# Patient Record
Sex: FEMALE | Race: WHITE | URBAN - METROPOLITAN AREA
[De-identification: names, ages, dates, MRNs, and addresses within clinical notes are randomized per-mention and may not be internally consistent; named-entity substitution may affect disease eponyms.]

---

## 2023-10-11 LAB
AMB EXT GLUCOSE 1HR OB: 122
AMB EXT HEMATOCRIT: 35.2
AMB EXT HEMOGLOBIN: 11.9

## 2023-10-16 ENCOUNTER — OFFICE VISIT (OUTPATIENT)
Dept: OBGYN CLINIC | Facility: CLINIC | Age: 28
End: 2023-10-16

## 2023-10-16 VITALS
HEIGHT: 67 IN | BODY MASS INDEX: 27 KG/M2 | DIASTOLIC BLOOD PRESSURE: 73 MMHG | SYSTOLIC BLOOD PRESSURE: 114 MMHG | HEART RATE: 81 BPM | WEIGHT: 172 LBS

## 2023-10-16 DIAGNOSIS — Z32.00 PREGNANCY EXAMINATION OR TEST, PREGNANCY UNCONFIRMED: Primary | ICD-10-CM

## 2023-10-16 RX ORDER — CHOLECALCIFEROL (VITAMIN D3) 25 MCG
1 TABLET,CHEWABLE ORAL DAILY
COMMUNITY

## 2023-10-20 ENCOUNTER — NURSE ONLY (OUTPATIENT)
Dept: OBGYN CLINIC | Facility: CLINIC | Age: 28
End: 2023-10-20
Payer: MEDICAID

## 2023-10-20 NOTE — PROGRESS NOTES
Pt is a  with EDC of 2024 based LMP-23. .     Med Hx-Pt denies     OB Hx-primip    Telephone OB RN Education visit. Education materials reviewed with pt including, but not limited to: plan of care, safe medications, guidance on nutrition, travel, food safety, when to call the MD,ect. Pt agreeable to blood transfusion if needed. First trimester labs done. See PN records. Pt counseled on the availability of genetic screenings including: cell free DNA, FTS w/US,Quad screen, MSAFP, and CF screening. Pt already had genetic testing done at previous OB office. Media policy for [facility] reviewed with pt. EPDS score for today-0    Epidural-yes    Circumcision-not sure    Feeding-breast    Transfusion-yes    How pt heard about the midwives office-friends    Still waiting for the rest of pt's prenatal records. Pt voices office was going to fax records to the midwives.

## 2023-10-30 ENCOUNTER — INITIAL PRENATAL (OUTPATIENT)
Dept: OBGYN CLINIC | Facility: CLINIC | Age: 28
End: 2023-10-30
Payer: MEDICAID

## 2023-10-30 VITALS
DIASTOLIC BLOOD PRESSURE: 69 MMHG | WEIGHT: 175.19 LBS | HEART RATE: 92 BPM | SYSTOLIC BLOOD PRESSURE: 110 MMHG | BODY MASS INDEX: 27 KG/M2

## 2023-10-30 DIAGNOSIS — Z34.02 ENCOUNTER FOR SUPERVISION OF NORMAL FIRST PREGNANCY IN SECOND TRIMESTER: Primary | ICD-10-CM

## 2023-10-30 PROCEDURE — 99212 OFFICE O/P EST SF 10 MIN: CPT | Performed by: ADVANCED PRACTICE MIDWIFE

## 2023-10-30 PROCEDURE — 3078F DIAST BP <80 MM HG: CPT | Performed by: ADVANCED PRACTICE MIDWIFE

## 2023-10-30 PROCEDURE — 3074F SYST BP LT 130 MM HG: CPT | Performed by: ADVANCED PRACTICE MIDWIFE

## 2023-11-10 ENCOUNTER — TELEPHONE (OUTPATIENT)
Dept: OBGYN CLINIC | Facility: CLINIC | Age: 28
End: 2023-11-10

## 2023-11-10 NOTE — TELEPHONE ENCOUNTER
Noted patient's labs were not entered during nurse education. Results entered in results console. Patient has completed NOB visit, records to scanning.
Labs/EKG/Imaging Studies

## 2023-11-29 ENCOUNTER — ROUTINE PRENATAL (OUTPATIENT)
Dept: OBGYN CLINIC | Facility: CLINIC | Age: 28
End: 2023-11-29
Payer: MEDICAID

## 2023-11-29 VITALS
SYSTOLIC BLOOD PRESSURE: 113 MMHG | DIASTOLIC BLOOD PRESSURE: 79 MMHG | BODY MASS INDEX: 28 KG/M2 | HEART RATE: 98 BPM | WEIGHT: 179 LBS

## 2023-11-29 DIAGNOSIS — Z34.03 PRENATAL CARE, FIRST PREGNANCY IN THIRD TRIMESTER: Primary | ICD-10-CM

## 2023-11-29 PROBLEM — Z34.90 PREGNANCY (HCC): Status: ACTIVE | Noted: 2023-11-29

## 2023-11-29 PROBLEM — Z34.90 PREGNANCY: Status: ACTIVE | Noted: 2023-11-29

## 2023-11-29 PROBLEM — Z59.41 FOOD INSECURITY: Status: ACTIVE | Noted: 2023-11-29

## 2023-11-29 PROCEDURE — 99212 OFFICE O/P EST SF 10 MIN: CPT | Performed by: ADVANCED PRACTICE MIDWIFE

## 2023-11-29 PROCEDURE — 3078F DIAST BP <80 MM HG: CPT | Performed by: ADVANCED PRACTICE MIDWIFE

## 2023-11-29 PROCEDURE — 3074F SYST BP LT 130 MM HG: CPT | Performed by: ADVANCED PRACTICE MIDWIFE

## 2023-11-29 NOTE — PROGRESS NOTES
Aureliano Arroyo, is at 31w0d, here for her [facility] visit. Currently, she is feeling well. Denies 3rd trimester danger signs. Obtained her records/labs/ultrasounds electronically and has them on her phone. Will upload screenshots after visit. She states her LMP is not certain and she was dated by early ultrasound. She believes she is between 31w4 and 32wga. Considering Tdap. Will decide before next visit. Of note: When history was reconciled, food insecurity was noted. When asked, the patient denied any such concern. Removed from problem list.    Vital signs and weight reviewed  See flowsheets     Assessment/Plan: Tdap recommended today but deferred at this time. Considering  Missing labs and ultrasounds: Pt to upload  Next visit: 2 weeks    Reviewed:   Prenatal visit schedule  Recommendations and rationale for Tdap vaccine(s) in pregnancy   labor precautions  Kick counts  Danger signs    Pt verbalized understanding. All questions answered.  No barriers to learning identified

## 2023-12-14 ENCOUNTER — ROUTINE PRENATAL (OUTPATIENT)
Dept: OBGYN CLINIC | Facility: CLINIC | Age: 28
End: 2023-12-14

## 2023-12-14 ENCOUNTER — TELEPHONE (OUTPATIENT)
Dept: OBGYN CLINIC | Facility: CLINIC | Age: 28
End: 2023-12-14

## 2023-12-14 VITALS
SYSTOLIC BLOOD PRESSURE: 128 MMHG | DIASTOLIC BLOOD PRESSURE: 83 MMHG | HEART RATE: 98 BPM | WEIGHT: 184 LBS | BODY MASS INDEX: 29 KG/M2

## 2023-12-14 DIAGNOSIS — Z28.21 TETANUS, DIPHTHERIA, AND ACELLULAR PERTUSSIS (TDAP) VACCINATION DECLINED: ICD-10-CM

## 2023-12-14 DIAGNOSIS — Z34.03 ENCOUNTER FOR SUPERVISION OF NORMAL FIRST PREGNANCY IN THIRD TRIMESTER: Primary | ICD-10-CM

## 2023-12-14 PROCEDURE — 3079F DIAST BP 80-89 MM HG: CPT | Performed by: ADVANCED PRACTICE MIDWIFE

## 2023-12-14 PROCEDURE — 99212 OFFICE O/P EST SF 10 MIN: CPT | Performed by: ADVANCED PRACTICE MIDWIFE

## 2023-12-14 PROCEDURE — 3074F SYST BP LT 130 MM HG: CPT | Performed by: ADVANCED PRACTICE MIDWIFE

## 2023-12-14 NOTE — PROGRESS NOTES
Gideon Trujillo is a 25year old , at 33w4d, here for her return OB visit. Currently, she is feeling well. Denies contractions, bleeding, and leakage of fluid. Endorses active fetus. Does not want to get Tdap vaccine in pregnancy. She brought her prenatal records from her previous practice with her today. Vital signs and weight reviewed  See flowsheets     Today's Assessment/Plan:   Reviewed recommendation for RSV vaccine between 32-36 weeks of pregnancy and handout provided to pt. Pt will think about it for next visit. Next visit: Follow up OB: 2 weeks    Reviewed:   3rd trimester precautions and expectations   labor precautions  Kick counts  Danger signs  Prenatal visit schedule      Pt verbalized understanding. All questions answered.  No barriers to learning identified

## 2023-12-15 ENCOUNTER — TELEPHONE (OUTPATIENT)
Dept: OBGYN CLINIC | Facility: CLINIC | Age: 28
End: 2023-12-15

## 2023-12-26 ENCOUNTER — ROUTINE PRENATAL (OUTPATIENT)
Dept: OBGYN CLINIC | Facility: CLINIC | Age: 28
End: 2023-12-26

## 2023-12-26 ENCOUNTER — TELEPHONE (OUTPATIENT)
Dept: OBGYN CLINIC | Facility: CLINIC | Age: 28
End: 2023-12-26

## 2023-12-26 VITALS
WEIGHT: 189 LBS | DIASTOLIC BLOOD PRESSURE: 82 MMHG | BODY MASS INDEX: 30 KG/M2 | HEART RATE: 83 BPM | SYSTOLIC BLOOD PRESSURE: 120 MMHG

## 2023-12-26 DIAGNOSIS — Z34.03 ENCOUNTER FOR SUPERVISION OF NORMAL FIRST PREGNANCY IN THIRD TRIMESTER: Primary | ICD-10-CM

## 2023-12-26 PROCEDURE — 3074F SYST BP LT 130 MM HG: CPT | Performed by: ADVANCED PRACTICE MIDWIFE

## 2023-12-26 PROCEDURE — 3079F DIAST BP 80-89 MM HG: CPT | Performed by: ADVANCED PRACTICE MIDWIFE

## 2023-12-26 PROCEDURE — 99212 OFFICE O/P EST SF 10 MIN: CPT | Performed by: ADVANCED PRACTICE MIDWIFE

## 2023-12-26 NOTE — PROGRESS NOTES
Active fetus Denies any complaints. Denies any vaginal bleeding, leaking of fluid or vaginal discharge. No signs signs of PTL. Reviewed S&S of PTL  Warning signs reviewed  All questions answered.   Counseled on RSV vaccine  pt refuses  Counseled on GBS - next visit

## 2024-01-01 NOTE — PROGRESS NOTES
Patient's last menstrual period was 2023 (approximate). 2024, by Ultrasound 36w1d here today for ABDIRASHID visit. Baby active. Denies contractions, LOF or bleeding.     GBS collected today.   Declines RSV vaccine  Reveiwed records available/scanned and not all NOB labs available. Ordered and pt to go down to lab to draw.     SOL and warning signs reviewed. 36 wk packet given. To review birth plan at [facility]     Declines Circ    Plans epidural. Ok with routine meds. 36 wk packet given and list of Peds given. Likely will use [facility].

## 2024-01-02 ENCOUNTER — LAB ENCOUNTER (OUTPATIENT)
Dept: LAB | Facility: HOSPITAL | Age: 29
End: 2024-01-02
Attending: ADVANCED PRACTICE MIDWIFE
Payer: MEDICAID

## 2024-01-02 ENCOUNTER — ROUTINE PRENATAL (OUTPATIENT)
Dept: OBGYN CLINIC | Facility: CLINIC | Age: 29
End: 2024-01-02

## 2024-01-02 VITALS
BODY MASS INDEX: 29 KG/M2 | SYSTOLIC BLOOD PRESSURE: 132 MMHG | HEART RATE: 86 BPM | DIASTOLIC BLOOD PRESSURE: 79 MMHG | WEIGHT: 188 LBS

## 2024-01-02 DIAGNOSIS — Z34.03 ENCOUNTER FOR SUPERVISION OF NORMAL FIRST PREGNANCY IN THIRD TRIMESTER: ICD-10-CM

## 2024-01-02 DIAGNOSIS — Z34.03 ENCOUNTER FOR SUPERVISION OF NORMAL FIRST PREGNANCY IN THIRD TRIMESTER: Primary | ICD-10-CM

## 2024-01-02 PROBLEM — Z71.85 VACCINE COUNSELING: Status: ACTIVE | Noted: 2024-01-02

## 2024-01-02 LAB
HBV SURFACE AG SER-ACNC: <0.1 [IU]/L
HBV SURFACE AG SERPL QL IA: NONREACTIVE
HCV AB SERPL QL IA: NONREACTIVE
RUBV IGG SER-ACNC: 8.8 IU/ML (ref 10–?)
T PALLIDUM AB SER QL IA: NONREACTIVE

## 2024-01-02 PROCEDURE — 87389 HIV-1 AG W/HIV-1&-2 AB AG IA: CPT

## 2024-01-02 PROCEDURE — 83021 HEMOGLOBIN CHROMOTOGRAPHY: CPT

## 2024-01-02 PROCEDURE — 3078F DIAST BP <80 MM HG: CPT | Performed by: ADVANCED PRACTICE MIDWIFE

## 2024-01-02 PROCEDURE — 3075F SYST BP GE 130 - 139MM HG: CPT | Performed by: ADVANCED PRACTICE MIDWIFE

## 2024-01-02 PROCEDURE — 83020 HEMOGLOBIN ELECTROPHORESIS: CPT

## 2024-01-02 PROCEDURE — 86787 VARICELLA-ZOSTER ANTIBODY: CPT

## 2024-01-02 PROCEDURE — 99213 OFFICE O/P EST LOW 20 MIN: CPT | Performed by: ADVANCED PRACTICE MIDWIFE

## 2024-01-02 PROCEDURE — 86780 TREPONEMA PALLIDUM: CPT

## 2024-01-02 PROCEDURE — 36415 COLL VENOUS BLD VENIPUNCTURE: CPT

## 2024-01-02 PROCEDURE — 86803 HEPATITIS C AB TEST: CPT

## 2024-01-02 PROCEDURE — 87340 HEPATITIS B SURFACE AG IA: CPT

## 2024-01-02 PROCEDURE — 86762 RUBELLA ANTIBODY: CPT

## 2024-01-02 NOTE — PATIENT INSTRUCTIONS
Kick Counts  It’s normal to worry about your baby’s health. One way you can know your baby’s doing well is to record the baby’s movements once a day. This is called a kick count.   Remember to take your kick count records to all your appointments with your healthcare provider.  How to count kicks    Time how long it takes you to feel 10 kicks, flutters, swishes, or rolls. Ideally, you want to feel at least 10 movements in 2 hours. You will likely feel 10 movements in less time than that.  Starting at 28 weeks, count your baby's movements daily. Follow your healthcare provider's instructions for kick counting. Here are tips for counting kicks:  Choose a time when the baby is active, such as after a meal.   Sit comfortably or lie on your side.   The first time the baby moves, write down the time.   Count each movement until the baby has moved  10 times. This can take from 20 minutes to 2 hours.   If you haven't felt 10 kicks by the end of the second hour, wait a few hours. Then try again.  Try to do it at the same time each day.  When to call your healthcare provider  Call your healthcare provider  right away if:  You do a couple sets of kick counts during the day and your baby moves fewer than 10 times in 2 hours.  Your baby moves much less often than on the days before.  You haven't felt your baby move all day.  [employer] last reviewed this educational content on 8/1/2020    © 4290-5040 [employer]. All rights reserved. This information is not intended as a substitute for professional medical care. Always follow your healthcare professional's instructions. Understanding Preeclampsia  Preeclampsia is a condition that can happen in pregnancy. It includes high blood pressure (hypertension), swelling, and signs of organ problems. It can show up around week 20 of pregnancy. It often goes away by 12 weeks after you give birth. It can lead to serious health risks for you and your baby. During your  pregnancy, your healthcare provider will watch your blood pressure.      Your blood pressure will be monitored regularly throughout your pregnancy to help check for preeclampsia.     Dangers of preeclampsia   If not treated, preeclampsia can cause problems for you and your baby. The placenta is the organ that nourishes your baby. It may tear away from the wall of the uterus. This can put the baby at risk for health problems (fetal distress). It can put the baby at risk for  birth. Preeclampsia can also cause these health problems in you:   Kidney failure or other organ damage  Seizures  Stroke  Who’s at risk for preeclampsia?   No one knows what causes preeclampsia. It can happen in any pregnant person. But there are things that increase your risk. You may need to take a daily low dose of aspirin if you are at risk for preeclampsia.   You’re at higher risk for preeclampsia if you have any of these:  Diabetes  High blood pressure  Obesity  Kidney disease  Autoimmune disease such as lupus  A family history of preeclampsia  You’re at higher risk if any of these apply to you:  This is your first pregnancy  You are having twins or more  You’re under age 18 or over age 40  You used in vitro fertilization  You are Black  And you’re at higher risk if you had any of these in a past pregnancy:   Preeclampsia  Intrauterine growth retardation (IUGR)   birth  Placental abruption  Fetal death  Symptoms  A common symptom of preeclampsia is high blood pressure. Other symptoms may include:   Fast weight gain  Protein in your urine  Headache  Belly (abdominal) pain on your right side  Vision problems such as flashes or spots  Swelling (edema) in your face or hands (this often happens near the end of a normal pregnancy)  Tests you may have   Your healthcare provider will want to check your blood pressure. This will need to be done often in your pregnancy. If your blood pressure is high, you may have these tests:   Urine  tests to look for protein  Blood tests to confirm preeclampsia  Fetal monitoring to make sure that your baby is healthy  Treating preeclampsia   Preeclampsia almost always ends soon after you give birth. Until then, your healthcare provider can help you manage it.   If your symptoms are mild, you may to:     Limit your activity  Rest in bed  Not do heavy lifting    If your symptoms are severe, you will stay in the hospital. Treatment here may include:   Limits to your activity. This is to help control your blood pressure. You should not lift anything heavy. You will need to spend 8 hours a day lying down with your feet up.  Magnesium IV (intravenous) drip. This is done during labor. It's to prevent seizures  Induced labor or  section. Birth of the baby is considered the cure for preeclampsia.  When to call your healthcare provider   Call your healthcare provider if your symptoms start quickly or are severe. This includes swelling, weight gain, or other symptoms. Some cases of preeclampsia are more severe than others. Your symptoms also may change or get worse as you get closer to your due date.   Once you give birth   In most cases, preeclampsia goes away on its own soon after you give birth. This is often by the 12th week after you deliver. Within days after you give birth, your blood pressure, swelling, and other symptoms should get better. But for some people, problems from preeclampsia can continue after birth.   Postpartum preeclampsia   Preeclampsia that starts after birth is rare. There are 2 types:   Postpartum preeclampsia. This may start in the first 48 hours after birth.  Late-onset preeclampsia. This starts more than 48 hours after birth.  Both of these types are rare. But call your healthcare provider right away if you have symptoms of preeclampsia after you give birth.   [employer] last reviewed this educational content on 10/1/2021    © 2966-4763 [employer]. All rights reserved.  This information is not intended as a substitute for professional medical care. Always follow your healthcare professional's instructions.

## 2024-01-03 ENCOUNTER — TELEPHONE (OUTPATIENT)
Dept: OBGYN CLINIC | Facility: CLINIC | Age: 29
End: 2024-01-03

## 2024-01-03 PROBLEM — Z28.39 RUBELLA NON-IMMUNE STATUS, ANTEPARTUM (HCC): Status: ACTIVE | Noted: 2024-01-03

## 2024-01-03 PROBLEM — Z28.39 RUBELLA NON-IMMUNE STATUS, ANTEPARTUM: Status: ACTIVE | Noted: 2024-01-03

## 2024-01-03 PROBLEM — O09.899 RUBELLA NON-IMMUNE STATUS, ANTEPARTUM (HCC): Status: ACTIVE | Noted: 2024-01-03

## 2024-01-03 PROBLEM — O09.899 RUBELLA NON-IMMUNE STATUS, ANTEPARTUM: Status: ACTIVE | Noted: 2024-01-03

## 2024-01-03 LAB
HGB A2 MFR BLD: 2.5 % (ref 1.5–3.5)
HGB PNL BLD ELPH: 97.5 % (ref 95.5–100)

## 2024-01-03 NOTE — TELEPHONE ENCOUNTER
Name and  verified    Patient given MJ results and recommendations and verbalized understanding. Rubella non-immune precautions discussed.

## 2024-01-03 NOTE — TELEPHONE ENCOUNTER
----- Message from Gloria Butler CNM sent at 1/3/2024 12:46 PM CST -----  Rubella non immune, will need MMR PP. Otherwise normal labs. Please inform. Thanks MJ

## 2024-01-04 LAB — GROUP B STREP BY PCR FOR PCR OVT: POSITIVE

## 2024-01-05 PROBLEM — O99.820 GROUP B STREPTOCOCCAL CARRIAGE COMPLICATING PREGNANCY (HCC): Status: ACTIVE | Noted: 2024-01-05

## 2024-01-05 PROBLEM — O99.820 GROUP B STREPTOCOCCAL CARRIAGE COMPLICATING PREGNANCY: Status: ACTIVE | Noted: 2024-01-05

## 2024-01-05 LAB — VZV IGG SER IA-ACNC: 156.2 (ref 165–?)

## 2024-01-07 PROBLEM — O09.899 SUSCEPTIBLE TO VARICELLA (NON-IMMUNE), CURRENTLY PREGNANT (HCC): Status: ACTIVE | Noted: 2024-01-07

## 2024-01-07 PROBLEM — O09.899 SUSCEPTIBLE TO VARICELLA (NON-IMMUNE), CURRENTLY PREGNANT: Status: ACTIVE | Noted: 2024-01-07

## 2024-01-07 PROBLEM — Z28.39 SUSCEPTIBLE TO VARICELLA (NON-IMMUNE), CURRENTLY PREGNANT (HCC): Status: ACTIVE | Noted: 2024-01-07

## 2024-01-07 PROBLEM — Z28.39 SUSCEPTIBLE TO VARICELLA (NON-IMMUNE), CURRENTLY PREGNANT: Status: ACTIVE | Noted: 2024-01-07

## 2024-01-08 NOTE — PROGRESS NOTES
Ernestine Huff is a 28 year old  pt at 37w2d here for ABDIRASHID  She is feeling well, Had a HA a couple days ago lasting a few hours, and epigastric pain.  denies dizziness, BV, spots or  Transfer from [facility]    Birth plan reviewed:    Support: , Arun  Pain management: OK with epidural  Vitamin K: yes  Erythromycin ointment: yes  Placenta: cord blood banking,   Circumcision: boy, declines  Peds: in House, undecided on practice  Housing/car seat: yes/ yes  Contraception: undecided  Feeding Method:  breast  Pump:  has      ROS:  Some back pain/BH, just a few, Denies cramping, bleeding, leaking of fluid.  Fetus is active.      Vitals:    24 1613   BP: 134/84   Pulse: 108   Weight: 190 lb (86.2 kg)      See flowsheet  TW lbs  Tdap, RSV declined  GBS pos  Elevated BP reading  GBS positive    Reviewed:  Labor precautions  Kick counts  Danger Signs/PreE s/s  Needs repeat HIV and Trep on admission  RTC 3 days for repeat BP check if sent home from triage  To triage now for r/o PreEMagali RN notified    Pt verbalized understanding.  All questions answered.  No barriers to learning identified

## 2024-01-09 ENCOUNTER — ROUTINE PRENATAL (OUTPATIENT)
Dept: OBGYN CLINIC | Facility: CLINIC | Age: 29
End: 2024-01-09

## 2024-01-09 ENCOUNTER — HOSPITAL ENCOUNTER (OUTPATIENT)
Facility: HOSPITAL | Age: 29
Discharge: HOME | End: 2024-01-09
Attending: ADVANCED PRACTICE MIDWIFE | Admitting: OBSTETRICS & GYNECOLOGY
Payer: MEDICAID

## 2024-01-09 VITALS
SYSTOLIC BLOOD PRESSURE: 134 MMHG | HEART RATE: 108 BPM | DIASTOLIC BLOOD PRESSURE: 84 MMHG | WEIGHT: 190 LBS | BODY MASS INDEX: 30 KG/M2

## 2024-01-09 VITALS — SYSTOLIC BLOOD PRESSURE: 131 MMHG | HEART RATE: 91 BPM | DIASTOLIC BLOOD PRESSURE: 85 MMHG

## 2024-01-09 DIAGNOSIS — Z34.03 ENCOUNTER FOR SUPERVISION OF NORMAL FIRST PREGNANCY IN THIRD TRIMESTER: Primary | ICD-10-CM

## 2024-01-09 LAB
ALBUMIN SERPL-MCNC: 3.7 G/DL (ref 3.2–4.8)
ALBUMIN/GLOB SERPL: 1.1 {RATIO} (ref 1–2)
ALP LIVER SERPL-CCNC: 148 U/L
ALT SERPL-CCNC: 23 U/L
ANION GAP SERPL CALC-SCNC: 6 MMOL/L (ref 0–18)
AST SERPL-CCNC: 23 U/L (ref ?–34)
BASOPHILS # BLD AUTO: 0.02 X10(3) UL (ref 0–0.2)
BASOPHILS NFR BLD AUTO: 0.2 %
BILIRUB SERPL-MCNC: 0.3 MG/DL (ref 0.3–1.2)
BUN BLD-MCNC: 7 MG/DL (ref 9–23)
BUN/CREAT SERPL: 10.8 (ref 10–20)
CALCIUM BLD-MCNC: 9.4 MG/DL (ref 8.7–10.4)
CHLORIDE SERPL-SCNC: 103 MMOL/L (ref 98–112)
CO2 SERPL-SCNC: 24 MMOL/L (ref 21–32)
CREAT BLD-MCNC: 0.65 MG/DL
CREAT UR-SCNC: 70.4 MG/DL
DEPRECATED RDW RBC AUTO: 45.6 FL (ref 35.1–46.3)
EGFRCR SERPLBLD CKD-EPI 2021: 121 ML/MIN/1.73M2 (ref 60–?)
EOSINOPHIL # BLD AUTO: 0.07 X10(3) UL (ref 0–0.7)
EOSINOPHIL NFR BLD AUTO: 0.7 %
ERYTHROCYTE [DISTWIDTH] IN BLOOD BY AUTOMATED COUNT: 13.5 % (ref 11–15)
FASTING STATUS PATIENT QL REPORTED: NO
GLOBULIN PLAS-MCNC: 3.5 G/DL (ref 2.8–4.4)
GLUCOSE BLD-MCNC: 88 MG/DL (ref 70–99)
HCT VFR BLD AUTO: 36.3 %
HGB BLD-MCNC: 12.1 G/DL
IMM GRANULOCYTES # BLD AUTO: 0.05 X10(3) UL (ref 0–1)
IMM GRANULOCYTES NFR BLD: 0.5 %
LYMPHOCYTES # BLD AUTO: 2.3 X10(3) UL (ref 1–4)
LYMPHOCYTES NFR BLD AUTO: 21.9 %
MCH RBC QN AUTO: 30.8 PG (ref 26–34)
MCHC RBC AUTO-ENTMCNC: 33.3 G/DL (ref 31–37)
MCV RBC AUTO: 92.4 FL
MONOCYTES # BLD AUTO: 0.59 X10(3) UL (ref 0.1–1)
MONOCYTES NFR BLD AUTO: 5.6 %
NEUTROPHILS # BLD AUTO: 7.49 X10 (3) UL (ref 1.5–7.7)
NEUTROPHILS # BLD AUTO: 7.49 X10(3) UL (ref 1.5–7.7)
NEUTROPHILS NFR BLD AUTO: 71.1 %
OSMOLALITY SERPL CALC.SUM OF ELEC: 273 MOSM/KG (ref 275–295)
PLATELET # BLD AUTO: 280 10(3)UL (ref 150–450)
POTASSIUM SERPL-SCNC: 3.9 MMOL/L (ref 3.5–5.1)
PROT SERPL-MCNC: 7.2 G/DL (ref 5.7–8.2)
PROT UR-MCNC: 15.7 MG/DL (ref ?–14)
PROT/CREAT UR-RTO: 0.22
RBC # BLD AUTO: 3.93 X10(6)UL
SODIUM SERPL-SCNC: 133 MMOL/L (ref 136–145)
WBC # BLD AUTO: 10.5 X10(3) UL (ref 4–11)

## 2024-01-09 PROCEDURE — 3079F DIAST BP 80-89 MM HG: CPT | Performed by: ADVANCED PRACTICE MIDWIFE

## 2024-01-09 PROCEDURE — 59025 FETAL NON-STRESS TEST: CPT | Performed by: ADVANCED PRACTICE MIDWIFE

## 2024-01-09 PROCEDURE — 3075F SYST BP GE 130 - 139MM HG: CPT | Performed by: ADVANCED PRACTICE MIDWIFE

## 2024-01-09 PROCEDURE — 99212 OFFICE O/P EST SF 10 MIN: CPT | Performed by: ADVANCED PRACTICE MIDWIFE

## 2024-01-09 NOTE — PROGRESS NOTES
Pt is a 28 year old female admitted to TR3/TR3-A.     Chief Complaint   Patient presents with    R/o Preeclampsia     Sent from office for high BP, HA and epigatric pain      Pt is  37w2d intra-uterine pregnancy.  History obtained, consents signed. Oriented to room, staff, and plan of care.

## 2024-01-10 PROBLEM — R51.9 HEADACHE IN PREGNANCY, ANTEPARTUM, THIRD TRIMESTER: Status: ACTIVE | Noted: 2024-01-10

## 2024-01-10 PROBLEM — R51.9 HEADACHE IN PREGNANCY, ANTEPARTUM, THIRD TRIMESTER (HCC): Status: ACTIVE | Noted: 2024-01-10

## 2024-01-10 PROBLEM — O26.893 HEADACHE IN PREGNANCY, ANTEPARTUM, THIRD TRIMESTER (HCC): Status: ACTIVE | Noted: 2024-01-10

## 2024-01-10 PROBLEM — O26.893 HEADACHE IN PREGNANCY, ANTEPARTUM, THIRD TRIMESTER: Status: ACTIVE | Noted: 2024-01-10

## 2024-01-10 NOTE — TRIAGE
[facility]      Triage Note    Ernestine Huff Patient Status:  Outpatient    1995 MRN [id number]   Location [facility] Attending Ingrid Argueta CNM   Hosp Day # 0 PCP No primary care provider on file.      Para:   Estimated Date of Delivery: 24  Gestation: 37w2d    Chief Complaint    R/o Preeclampsia         Allergies:  No Known Allergies    Orders Placed This Encounter   Procedures    Comp Metabolic Panel (14)    CBC With Differential With Platelet    Protein/Creatinine Ratio, Urine Random       Lab Results   Component Value Date    WBC 10.5 2024    HGB 12.1 2024    HCT 36.3 2024    .0 2024    CREATSERUM 0.65 2024    BUN 7 (L) 2024     (L) 2024    K 3.9 2024     2024    CO2 24.0 2024    GLU 88 2024    CA 9.4 2024    ALB 3.7 2024    ALKPHO 148 (H) 2024    BILT 0.3 2024    TP 7.2 2024    AST 23 2024    ALT 23 2024       Clinitek UA  Lab Results   Component Value Date    URINECUL  2024     <10,000 cfu/ml Mixture of Gram positive organisms isolated - probable contamination.       UA  No results found for: \"COLORUR\", \"CLARITY\", \"SPECGRAVITY\", \"PROUR\", \"GLUUR\", \"KETUR\", \"BILUR\", \"BLOODURINE\", \"NITRITE\", \"UROBILINOGEN\", \"LEUUR\", \"UASA\"    Vitals:    24 1700 24 1715 24 1730   BP: 128/88 131/82 129/88   Pulse: 98 92 92       NST  Variability: Moderate           Accelerations: Yes           Decelerations: None            Baseline: 145 BPM           Uterine Irritability: Yes           Contractions: Not present                                        Acoustic Stimulator: No           Nonstress Test Interpretation: Reactive           Nonstress Test Second Interpretation: Reactive          FHR Category: Category I             Additional Comments Comments:  35  sent from office to triage for elevated BPs.  Bps in triage WNL. Labs WNL. RONALDO Argueta Notified.     Chief Complaint   Patient presents with    R/o Preeclampsia     Sent from office for high BP, HA and epigatric pain         Magali CELESTIN RN  1/9/2024 6:10 PM      CNM Addendum  NST, labs and blood pressures reviewed and WNL. Discharge home. Follow-up with ABDIRASHID next week.  Ingrid Argueta CNM

## 2024-01-18 ENCOUNTER — ROUTINE PRENATAL (OUTPATIENT)
Dept: OBGYN CLINIC | Facility: CLINIC | Age: 29
End: 2024-01-18

## 2024-01-18 VITALS
SYSTOLIC BLOOD PRESSURE: 129 MMHG | DIASTOLIC BLOOD PRESSURE: 84 MMHG | HEART RATE: 92 BPM | BODY MASS INDEX: 30 KG/M2 | WEIGHT: 193 LBS

## 2024-01-18 DIAGNOSIS — Z34.03 ENCOUNTER FOR SUPERVISION OF NORMAL FIRST PREGNANCY IN THIRD TRIMESTER: Primary | ICD-10-CM

## 2024-01-18 PROCEDURE — 99212 OFFICE O/P EST SF 10 MIN: CPT | Performed by: ADVANCED PRACTICE MIDWIFE

## 2024-01-18 PROCEDURE — 3079F DIAST BP 80-89 MM HG: CPT | Performed by: ADVANCED PRACTICE MIDWIFE

## 2024-01-18 PROCEDURE — 3074F SYST BP LT 130 MM HG: CPT | Performed by: ADVANCED PRACTICE MIDWIFE

## 2024-01-18 NOTE — PROGRESS NOTES
Ernestine is a 28 year old , at 38w4d, here for her return OB visit.  Currently, she is feeling well. Denies contractions, bleeding, leakage of fluid, and headache. Endorses active fetus.    Vital signs and weight reviewed  See flowsheets       Today's Assessment/Plan:   Recognizing labor reviewed  Natural methods for cervical ripening/stimulating labor reviewed    Next visit: Follow up OB: 1 week    Reviewed:   3rd trimester precautions and expectations  Labor precautions  Kick counts  Danger signs      Pt verbalized understanding. All questions answered. No barriers to learning identified

## 2024-01-18 NOTE — PATIENT INSTRUCTIONS
Understanding Preeclampsia  Preeclampsia is high blood pressure (hypertension) that happens during pregnancy. It often shows up around the 20th week of pregnancy. It often goes back to normal by the 12th week after you give birth. It can lead to serious health risks for you and your baby. During your pregnancy, your healthcare provider will watch your blood pressure.    Symptoms  A common symptom of preeclampsia is high blood pressure. Other symptoms may include:  Rapid weight gain  Protein in your urine  Headache  Belly (abdominal) pain on your right side  Vision problems. These include flashes or spots.  Swelling (edema) in your face or hands. This also often happens near the end of normal pregnancies, even without preeclampsia.  Tests you may have  Your healthcare provider will want to check your blood pressure throughout your pregnancy. If your blood pressure is high, you may have the following tests:  Urine tests to look for protein  Blood tests to confirm preeclampsia  Fetal monitoring to make sure that your baby is healthy  Treating preeclampsia  You may need to take a daily low dose of aspirin if you are at risk for preeclampsia. Preeclampsia almost always ends soon after you give birth. Until then, your healthcare provider can help manage your condition. If your symptoms are mild, you may need activity limits at home, including bed rest and no heavy lifting. If your symptoms are severe, you will stay in the hospital. Hospital treatment includes:  Activity limits to help control blood pressure. This means no heavy lifting and 8 hours per day lying down with the feet up.  Magnesium IV (intravenous) drip during labor to prevent seizures  Induced labor or surgical delivery by  section. Delivery is considered the cure for preeclampsia.  When to call your healthcare provider  Call your healthcare provider if swelling, weight gain, or other symptoms come on quickly or are severe. Some cases of  preeclampsia are more severe than others. Your symptoms also may change or get worse as you get closer to your due date.  Who’s at risk?  No one knows what causes preeclampsia. Preeclampsia can happen in any pregnant woman. But it is more common in first-time pregnancies. Things that increase the risk include:  Previous pregnancies. You are at risk if you had preeclampsia, intrauterine growth retardation (IUGR),  birth, placental abruption, or fetal death in a past pregnancy.  Health history of mother. You are at risk if you have diabetes, high blood pressure, obesity, kidney disease, autoimmune disease such as lupus, or a family history of preeclampsia.  Current pregnancy. You are at risk if this is your first pregnancy, or if you have multiple fetuses, are younger than age 18 or older than 40, or used in vitro fertilization.  Race. You are at risk if you are black.  Dangers of preeclampsia  If not treated, preeclampsia can cause problems for you and your baby. The placenta is the organ that nourishes your baby. It may tear away from the uterine wall. This can put the baby at risk for health problems (fetal distress) and premature birth. Preeclampsia can also cause these health problems:  Kidney failure or other organ damage  Seizures  Stroke  Once you give birth  In most cases, preeclampsia goes away on its own soon after you give birth. This is often by the 12th week after you give deliver. Within days of delivery, your blood pressure, swelling, and other symptoms should get better. For some women, problems from preeclampsia can continue after delivery.  Postpartum preeclampsia that develops within the first 48 hours after delivery is rare. Another type of postpartum preeclampsia that develops more than 48 hours after delivery is called late-onset preeclampsia. It is also rare. Contact your healthcare provider right away if you have symptoms of preeclampsia after you deliver.  Ellen last reviewed this  educational content on 12/1/2019 © 2000-2020 [employer]. [address] [place] [id number]. All rights reserved. This information is not intended as a substitute for professional medical care. Always follow your healthcare professional's instructions.        Kick Counts    It’s normal to worry about your baby’s health. One way you can know your baby’s doing well is to record the baby’s movements once a day. This is called a kick count. Remember to take your kick count records to all your appointments with your healthcare provider.  How to count kicks  Time how long it takes you to feel 10 kicks, flutters, swishes, or rolls. Ideally, you want to feel at least 10 movements within 2 hours. You will likely feel 10 movements in less time than that.  Starting at 28 weeks, count your baby's movements daily. Follow your healthcare provider's instructions for kick counting. Here are tips for counting kicks:  Choose a time when the baby is active, such as after a meal.   Sit comfortably or lie on your side.   The first time the baby moves, write down the time.   Count each movement until the baby has moved 10 times. This can take from 20 minutes to 2 hours.   If you have not felt 10 kicks by the end of the second hour, wait a few hours. Then try again.  Try to do it at the same time each day.  When to call your healthcare provider  Call your healthcare provider right away if:  You do a couple sets of kick counts during the day and your baby moves fewer than 10 times in 2 hours  Your baby moves much less often than on the days before.  You have not felt your baby move all day.  [employer] last reviewed this educational content on 12/1/2017 © 2000-2020 [employer]. [address] [place] [id number]. All rights reserved. This information is not intended as a substitute for professional medical care. Always follow your healthcare professional's instructions.        Recognizing  Labor    The beginning of labor is the beginning of birth. You’ll start to feel strong contractions. That’s when the muscles of your uterus tighten up to help push your baby out during birth.  Yes, labor has probably started   Signs of labor include:  Your contractions are getting stronger and more painful instead of weaker. You’ll probably feel them throughout your whole uterus.  Your contractions are regular. This means that you feel them about every 5 to 10 minutes. And they are getting closer together.  You have pink-colored or blood-streaked fluid from your vagina.  You feel that the baby has \"dropped\" lower in your pelvis   Your water breaks. It may be a gush or a slow trickle of clear fluid from your vagina.  No, it’s probably not real labor   Signs of false labor include:  Your contractions aren’t regular or strong.  You feel the contractions only in your lower uterus.  Your contractions go away when you walk or change position.  Your contractions go away after drinking fluids.  When to call your healthcare provider  Call your healthcare provider or clinic right away if you notice any of these signs:  Fluid from your vagina, with or without contractions.  Bleeding heavy enough that you need a sanitary pad.  You don’t feel your baby moving as much as before.     NOTE: Contractions are timed by both of these measures:  The length of each contraction from its start to its finish.  How far apart the contractions are--the time between the start of one contraction and the start of the next contraction.   Jet last reviewed this educational content on 10/1/2017  © 7391-7513 The "[employer]. [address] [place] [address], [place] [address]. All rights reserved. This information is not intended as a substitute for professional medical care. Always follow your healthcare professional's instructions.        Stages of Labor    Labor has 3 stages. Your healthcare provider may talk about the progress of your labor  with certain words. One of these is your baby’s position. Another is your baby’s station. And the effacement and dilation of your cervix will be noted. Read below to learn about these terms and the 3 stages of labor.  Your baby moves into position  Position is your baby’s placement in your uterus. Your baby may be facing left or right. He or she may be head first or feet first. Station refers to how far your baby has moved down into your pelvic cavity.  First stage of labor  During the first stage of labor, contractions of the uterus help your cervix thin (efface). They also help it widen (dilate). This will help your baby pass through the birth canal (vagina). At first your contractions will not come that often or last that long. But as time passes, they will come more often, they may be more painful, and they will last longer. They will last about 30 to 60 seconds each. The first stage of labor lasts until the cervix is fully dilated.  Second stage of labor  When your cervix is fully dilated, the second stage of labor begins. In this stage, you will have stronger contractions of your uterus that will help your baby move down the birth canal. They may happen every 2 to 5 minutes. They may last from 45 to 90 seconds each. Your healthcare provider will ask you to push with each contraction. Try to rest between the contractions if you can. Your baby is delivered at the end of this stage of labor.  Third stage of labor  The third stage of labor comes after your baby is born. This is when the afterbirth (placenta) comes out of your uterus. Your uterus will continue to contract. But the contractions are much milder than before.  [employer] last reviewed this educational content on 10/1/2017  © 8388-5608 [employer]. [address] [place] [address], [place] [address]. All rights reserved. This information is not intended as a substitute for professional medical care. Always follow your healthcare professional's  instructions.

## 2024-01-24 ENCOUNTER — ROUTINE PRENATAL (OUTPATIENT)
Dept: OBGYN CLINIC | Facility: CLINIC | Age: 29
End: 2024-01-24

## 2024-01-24 ENCOUNTER — HOSPITAL ENCOUNTER (OUTPATIENT)
Facility: HOSPITAL | Age: 29
Discharge: HOME | End: 2024-01-24
Attending: ADVANCED PRACTICE MIDWIFE | Admitting: OBSTETRICS & GYNECOLOGY
Payer: MEDICAID

## 2024-01-24 ENCOUNTER — HOSPITAL ENCOUNTER (INPATIENT)
Facility: HOSPITAL | Age: 29
LOS: 4 days | Discharge: HOME | End: 2024-01-28
Attending: ADVANCED PRACTICE MIDWIFE | Admitting: OBSTETRICS & GYNECOLOGY
Payer: MEDICAID

## 2024-01-24 VITALS
HEART RATE: 99 BPM | SYSTOLIC BLOOD PRESSURE: 149 MMHG | BODY MASS INDEX: 31 KG/M2 | DIASTOLIC BLOOD PRESSURE: 95 MMHG | WEIGHT: 196 LBS

## 2024-01-24 VITALS — SYSTOLIC BLOOD PRESSURE: 139 MMHG | DIASTOLIC BLOOD PRESSURE: 93 MMHG | HEART RATE: 94 BPM

## 2024-01-24 DIAGNOSIS — Z34.03 PRENATAL CARE, FIRST PREGNANCY IN THIRD TRIMESTER: Primary | ICD-10-CM

## 2024-01-24 PROBLEM — O13.3 GESTATIONAL HYPERTENSION, THIRD TRIMESTER (HCC): Status: ACTIVE | Noted: 2024-01-24

## 2024-01-24 PROBLEM — R03.0 ELEVATED BP WITHOUT DIAGNOSIS OF HYPERTENSION: Status: ACTIVE | Noted: 2024-01-24

## 2024-01-24 PROBLEM — O13.3 GESTATIONAL HYPERTENSION, THIRD TRIMESTER: Status: ACTIVE | Noted: 2024-01-24

## 2024-01-24 LAB
ALBUMIN SERPL-MCNC: 3.8 G/DL (ref 3.2–4.8)
ALBUMIN/GLOB SERPL: 1.2 {RATIO} (ref 1–2)
ALP LIVER SERPL-CCNC: 178 U/L
ALT SERPL-CCNC: 29 U/L
ANION GAP SERPL CALC-SCNC: 9 MMOL/L (ref 0–18)
ANTIBODY SCREEN: NEGATIVE
AST SERPL-CCNC: 24 U/L (ref ?–34)
BASOPHILS # BLD AUTO: 0.03 X10(3) UL (ref 0–0.2)
BASOPHILS NFR BLD AUTO: 0.3 %
BILIRUB SERPL-MCNC: 0.3 MG/DL (ref 0.3–1.2)
BUN BLD-MCNC: 7 MG/DL (ref 9–23)
BUN/CREAT SERPL: 10.9 (ref 10–20)
CALCIUM BLD-MCNC: 9.2 MG/DL (ref 8.7–10.4)
CHLORIDE SERPL-SCNC: 107 MMOL/L (ref 98–112)
CO2 SERPL-SCNC: 20 MMOL/L (ref 21–32)
CREAT BLD-MCNC: 0.64 MG/DL
CREAT UR-SCNC: 16.5 MG/DL
DEPRECATED RDW RBC AUTO: 47.5 FL (ref 35.1–46.3)
EGFRCR SERPLBLD CKD-EPI 2021: 122 ML/MIN/1.73M2 (ref 60–?)
EOSINOPHIL # BLD AUTO: 0.09 X10(3) UL (ref 0–0.7)
EOSINOPHIL NFR BLD AUTO: 0.8 %
ERYTHROCYTE [DISTWIDTH] IN BLOOD BY AUTOMATED COUNT: 14.3 % (ref 11–15)
FASTING STATUS PATIENT QL REPORTED: NO
GLOBULIN PLAS-MCNC: 3.1 G/DL (ref 2.8–4.4)
GLUCOSE BLD-MCNC: 85 MG/DL (ref 70–99)
HCT VFR BLD AUTO: 34.4 %
HGB BLD-MCNC: 11.4 G/DL
IMM GRANULOCYTES # BLD AUTO: 0.04 X10(3) UL (ref 0–1)
IMM GRANULOCYTES NFR BLD: 0.4 %
LYMPHOCYTES # BLD AUTO: 2.77 X10(3) UL (ref 1–4)
LYMPHOCYTES NFR BLD AUTO: 25.1 %
MCH RBC QN AUTO: 30.2 PG (ref 26–34)
MCHC RBC AUTO-ENTMCNC: 33.1 G/DL (ref 31–37)
MCV RBC AUTO: 91.2 FL
MONOCYTES # BLD AUTO: 0.74 X10(3) UL (ref 0.1–1)
MONOCYTES NFR BLD AUTO: 6.7 %
NEUTROPHILS # BLD AUTO: 7.38 X10 (3) UL (ref 1.5–7.7)
NEUTROPHILS # BLD AUTO: 7.38 X10(3) UL (ref 1.5–7.7)
NEUTROPHILS NFR BLD AUTO: 66.7 %
OSMOLALITY SERPL CALC.SUM OF ELEC: 279 MOSM/KG (ref 275–295)
PLATELET # BLD AUTO: 265 10(3)UL (ref 150–450)
POTASSIUM SERPL-SCNC: 4 MMOL/L (ref 3.5–5.1)
PROT SERPL-MCNC: 6.9 G/DL (ref 5.7–8.2)
PROT UR-MCNC: <6 MG/DL (ref ?–14)
RBC # BLD AUTO: 3.77 X10(6)UL
RH BLOOD TYPE: POSITIVE
RH BLOOD TYPE: POSITIVE
SODIUM SERPL-SCNC: 136 MMOL/L (ref 136–145)
WBC # BLD AUTO: 11.1 X10(3) UL (ref 4–11)

## 2024-01-24 PROCEDURE — 3080F DIAST BP >= 90 MM HG: CPT | Performed by: ADVANCED PRACTICE MIDWIFE

## 2024-01-24 PROCEDURE — 84156 ASSAY OF PROTEIN URINE: CPT | Performed by: ADVANCED PRACTICE MIDWIFE

## 2024-01-24 PROCEDURE — 99213 OFFICE O/P EST LOW 20 MIN: CPT | Performed by: ADVANCED PRACTICE MIDWIFE

## 2024-01-24 PROCEDURE — 85025 COMPLETE CBC W/AUTO DIFF WBC: CPT | Performed by: ADVANCED PRACTICE MIDWIFE

## 2024-01-24 PROCEDURE — 59025 FETAL NON-STRESS TEST: CPT

## 2024-01-24 PROCEDURE — 3077F SYST BP >= 140 MM HG: CPT | Performed by: ADVANCED PRACTICE MIDWIFE

## 2024-01-24 PROCEDURE — 80053 COMPREHEN METABOLIC PANEL: CPT | Performed by: ADVANCED PRACTICE MIDWIFE

## 2024-01-24 PROCEDURE — 82570 ASSAY OF URINE CREATININE: CPT | Performed by: ADVANCED PRACTICE MIDWIFE

## 2024-01-24 PROCEDURE — 36415 COLL VENOUS BLD VENIPUNCTURE: CPT

## 2024-01-24 PROCEDURE — 99213 OFFICE O/P EST LOW 20 MIN: CPT

## 2024-01-24 PROCEDURE — 3E0P7VZ INTRODUCTION OF HORMONE INTO FEMALE REPRODUCTIVE, VIA NATURAL OR ARTIFICIAL OPENING: ICD-10-PCS | Performed by: ADVANCED PRACTICE MIDWIFE

## 2024-01-24 PROCEDURE — 99221 1ST HOSP IP/OBS SF/LOW 40: CPT | Performed by: ADVANCED PRACTICE MIDWIFE

## 2024-01-24 RX ORDER — CITRIC ACID/SODIUM CITRATE 334-500MG
30 SOLUTION, ORAL ORAL AS NEEDED
Status: DISCONTINUED | OUTPATIENT
Start: 2024-01-24 | End: 2024-01-26

## 2024-01-24 RX ORDER — IBUPROFEN 600 MG/1
600 TABLET ORAL ONCE AS NEEDED
Status: DISCONTINUED | OUTPATIENT
Start: 2024-01-24 | End: 2024-01-26

## 2024-01-24 RX ORDER — NALBUPHINE HYDROCHLORIDE 10 MG/ML
10 INJECTION, SOLUTION INTRAMUSCULAR; INTRAVENOUS; SUBCUTANEOUS EVERY 6 HOURS PRN
Status: DISCONTINUED | OUTPATIENT
Start: 2024-01-24 | End: 2024-01-26

## 2024-01-24 RX ORDER — HYDROXYZINE HYDROCHLORIDE 50 MG/ML
50 INJECTION, SOLUTION INTRAMUSCULAR EVERY 6 HOURS PRN
Status: DISCONTINUED | OUTPATIENT
Start: 2024-01-24 | End: 2024-01-26

## 2024-01-24 RX ORDER — ACETAMINOPHEN 500 MG
500 TABLET ORAL EVERY 6 HOURS PRN
Status: DISCONTINUED | OUTPATIENT
Start: 2024-01-24 | End: 2024-01-26

## 2024-01-24 RX ORDER — DEXTROSE, SODIUM CHLORIDE, SODIUM LACTATE, POTASSIUM CHLORIDE, AND CALCIUM CHLORIDE 5; .6; .31; .03; .02 G/100ML; G/100ML; G/100ML; G/100ML; G/100ML
INJECTION, SOLUTION INTRAVENOUS CONTINUOUS
Status: DISCONTINUED | OUTPATIENT
Start: 2024-01-24 | End: 2024-01-26

## 2024-01-24 RX ORDER — ONDANSETRON 2 MG/ML
4 INJECTION INTRAMUSCULAR; INTRAVENOUS EVERY 6 HOURS PRN
Status: DISCONTINUED | OUTPATIENT
Start: 2024-01-24 | End: 2024-01-26

## 2024-01-24 RX ORDER — ACETAMINOPHEN 500 MG
1000 TABLET ORAL EVERY 6 HOURS PRN
Status: DISCONTINUED | OUTPATIENT
Start: 2024-01-24 | End: 2024-01-26

## 2024-01-24 RX ORDER — TERBUTALINE SULFATE 1 MG/ML
0.25 INJECTION, SOLUTION SUBCUTANEOUS AS NEEDED
Status: DISCONTINUED | OUTPATIENT
Start: 2024-01-24 | End: 2024-01-26

## 2024-01-24 RX ORDER — SODIUM CHLORIDE, SODIUM LACTATE, POTASSIUM CHLORIDE, CALCIUM CHLORIDE 600; 310; 30; 20 MG/100ML; MG/100ML; MG/100ML; MG/100ML
INJECTION, SOLUTION INTRAVENOUS AS NEEDED
Status: DISCONTINUED | OUTPATIENT
Start: 2024-01-24 | End: 2024-01-26

## 2024-01-24 RX ORDER — LIDOCAINE HYDROCHLORIDE 10 MG/ML
30 INJECTION, SOLUTION EPIDURAL; INFILTRATION; INTRACAUDAL; PERINEURAL ONCE
Status: DISCONTINUED | OUTPATIENT
Start: 2024-01-24 | End: 2024-01-26

## 2024-01-24 NOTE — TRIAGE
[facility]      Triage Note    Ernestine Huff Patient Status:  Outpatient    1995 MRN [id number]   Location [facility] Attending Gloria Butler CNM   Hosp Day # 0 PCP No primary care provider on file.      Para:   Estimated Date of Delivery: 24  Gestation: 39w3d    Chief Complaint    R/o Preeclampsia         Allergies:  No Known Allergies    Orders Placed This Encounter   Procedures    Comp Metabolic Panel (14)    CBC With Differential With Platelet    Protein/Creatinine Ratio, Urine Random       Lab Results   Component Value Date    WBC 11.1 (H) 2024    HGB 11.4 (L) 2024    HCT 34.4 (L) 2024    .0 2024    CREATSERUM 0.64 2024    BUN 7 (L) 2024     2024    K 4.0 2024     2024    CO2 20.0 (L) 2024    GLU 85 2024    CA 9.2 2024    ALB 3.8 2024    ALKPHO 178 (H) 2024    BILT 0.3 2024    TP 6.9 2024    AST 24 2024    ALT 29 2024       Clinitek UA  Lab Results   Component Value Date    URINECUL  2024     <10,000 cfu/ml Mixture of Gram positive organisms isolated - probable contamination.       UA  No results found for: \"COLORUR\", \"CLARITY\", \"SPECGRAVITY\", \"PROUR\", \"GLUUR\", \"KETUR\", \"BILUR\", \"BLOODURINE\", \"NITRITE\", \"UROBILINOGEN\", \"LEUUR\", \"UASA\"    Vitals:    24 1300 24 1315 24 1330 24 1345   BP: 129/89 132/86 (!) 132/93 (!) 139/93   Pulse: 101 96 86 94       NST  Variability: Moderate           Accelerations: Yes           Decelerations: None            Baseline: 130 BPM           Uterine Irritability: No           Contractions: Irregular                                        Acoustic Stimulator: No           Nonstress Test Interpretation: Reactive                      FHR Category: Category I             Additional Comments Comments: Pt is a  at 39.3 sent in from office for elevated  BP, Labs drawn. HELGA Butler CNM informed of finding. evaluated pt in person and recommends IOL. Pt agrees with plan of care. Pt to be discharged home now and return today at 5pm for IOL. Labor precautions provided. Pt verbalizes understanding.     Chief Complaint   Patient presents with    R/o Preeclampsia         Elizabeth ARNOLD RN  1/24/2024 2:20 PM    Sent over from office for r/o pre-e. Reactive NST. Normal labs however b/p's remain elevated. Recommend IOL for GHTN. Desires to go home and get thing and return. She will be back around 5 pm.

## 2024-01-24 NOTE — PROGRESS NOTES
Pt is a 28 year old female admitted to TR3/[facility]3-A.     Chief Complaint   Patient presents with    R/o Preeclampsia      Pt is  39w3d intra-uterine pregnancy.  History obtained, consents signed. Oriented to room, staff, and plan of care.

## 2024-01-24 NOTE — PROGRESS NOTES
Ernestine, , is at 39w3d, here for her ABDIRASHID visit.  Currently, she is feeling well. Denies 3rd trimester danger signs. Specifically, no headache, vision changes, or epigastric pain. Accepts SVE today.    Vital signs and weight reviewed  See flowsheets     Assessment/Plan: Elevated BP in office today. To Triage for further evaluation, serial BP's and pre-eclampsia labs  Next visit: Friday if not induced today    Reviewed:   Prenatal visit schedule  Kick counts  Danger signs  Labor precautions    I have spent 20 minutes total time on the day of the encounter, including: preparing to see the patient, ordering/reviewing labs, performing a medically appropriate exam, and providing care coordination. face to face counseling, chart review, orders and coordination of care    Pt verbalized understanding. All questions answered. No barriers to learning identified

## 2024-01-25 ENCOUNTER — ANESTHESIA (OUTPATIENT)
Dept: OBGYN UNIT | Facility: HOSPITAL | Age: 29
End: 2024-01-25
Payer: MEDICAID

## 2024-01-25 ENCOUNTER — ANESTHESIA EVENT (OUTPATIENT)
Dept: OBGYN UNIT | Facility: HOSPITAL | Age: 29
End: 2024-01-25
Payer: MEDICAID

## 2024-01-25 PROBLEM — Z34.90 ENCOUNTER FOR INDUCTION OF LABOR: Status: ACTIVE | Noted: 2024-01-25

## 2024-01-25 PROBLEM — Z34.90 ENCOUNTER FOR INDUCTION OF LABOR (HCC): Status: ACTIVE | Noted: 2024-01-25

## 2024-01-25 PROCEDURE — 10907ZC DRAINAGE OF AMNIOTIC FLUID, THERAPEUTIC FROM PRODUCTS OF CONCEPTION, VIA NATURAL OR ARTIFICIAL OPENING: ICD-10-PCS | Performed by: ADVANCED PRACTICE MIDWIFE

## 2024-01-25 PROCEDURE — 59200 INSERT CERVICAL DILATOR: CPT | Performed by: ADVANCED PRACTICE MIDWIFE

## 2024-01-25 PROCEDURE — 3E033VJ INTRODUCTION OF OTHER HORMONE INTO PERIPHERAL VEIN, PERCUTANEOUS APPROACH: ICD-10-PCS | Performed by: ADVANCED PRACTICE MIDWIFE

## 2024-01-25 RX ORDER — BUPIVACAINE HCL/0.9 % NACL/PF 0.25 %
5 PLASTIC BAG, INJECTION (ML) EPIDURAL AS NEEDED
Status: DISCONTINUED | OUTPATIENT
Start: 2024-01-25 | End: 2024-01-26

## 2024-01-25 RX ORDER — NALBUPHINE HYDROCHLORIDE 10 MG/ML
2.5 INJECTION, SOLUTION INTRAMUSCULAR; INTRAVENOUS; SUBCUTANEOUS
Status: DISCONTINUED | OUTPATIENT
Start: 2024-01-25 | End: 2024-01-26

## 2024-01-25 RX ORDER — LIDOCAINE HYDROCHLORIDE 10 MG/ML
INJECTION, SOLUTION EPIDURAL; INFILTRATION; INTRACAUDAL; PERINEURAL AS NEEDED
Status: DISCONTINUED | OUTPATIENT
Start: 2024-01-25 | End: 2024-01-26 | Stop reason: SURG

## 2024-01-25 RX ORDER — BUPIVACAINE HYDROCHLORIDE 2.5 MG/ML
20 INJECTION, SOLUTION EPIDURAL; INFILTRATION; INTRACAUDAL ONCE
Status: DISCONTINUED | OUTPATIENT
Start: 2024-01-25 | End: 2024-01-26

## 2024-01-25 RX ORDER — LIDOCAINE HYDROCHLORIDE AND EPINEPHRINE 15; 5 MG/ML; UG/ML
INJECTION, SOLUTION EPIDURAL AS NEEDED
Status: DISCONTINUED | OUTPATIENT
Start: 2024-01-25 | End: 2024-01-26 | Stop reason: SURG

## 2024-01-25 RX ADMIN — LIDOCAINE HYDROCHLORIDE 5 ML: 10 INJECTION, SOLUTION EPIDURAL; INFILTRATION; INTRACAUDAL; PERINEURAL at 13:33:00

## 2024-01-25 RX ADMIN — LIDOCAINE HYDROCHLORIDE AND EPINEPHRINE 3 ML: 15; 5 INJECTION, SOLUTION EPIDURAL at 13:43:00

## 2024-01-25 NOTE — H&P
[facility]    History & Physical    Ernestine Huff Patient Status:  Inpatient    1995 MRN [id number]   Location [facility] Attending Gloria Butler CNM   Hosp Day # 0 Admitting Gloria Wallace MD     Date of Admission:  2024      HPI:   Ernestine Huff is 28 year old and  with current gestational age of 39w3d and estimated due date of 2024, by 12 week Ultrasound as LMP uncertain.    Ernestine is being admitted for induction of labor.    Her current obstetrical history is significant for GBS colonizer, Rubella non immune, Gestational HTN .    Patient reports good fetal movement, no bleeding, no contractions, no leaking, and No headaches, No blurred vision  .     Fetal Movement: normal.     History   Obstetric History:   OB History    Para Term  AB Living   1             SAB IAB Ectopic Multiple Live Births                  # Outcome Date GA Lbr Kelvin/2nd Weight Sex Delivery Anes PTL Lv   1 Current              Past Medical History: No past medical history on file.  Past Social History: No past surgical history on file.  Family History:   Family History   Problem Relation Age of Onset    Hypertension Father      Social History:   Social History     Tobacco Use    Smoking status: Never    Smokeless tobacco: Never   Substance Use Topics    Alcohol use: Never        Allergies/Medications:   Allergies:   No Known Allergies  Medications:  Medications Prior to Admission   Medication Sig Dispense Refill Last Dose    prenatal vitamin with DHA 27-0.8-228 MG Oral Cap Take 1 capsule by mouth daily.          Review of Systems:   Constitutional: denies fever, aches, chills  HEENT: denies visual changes  Skin: denies any unusual skin lesions or itching  Lungs: denies shortness of breath  Cardiovascular: denies chest pain  GI: denies abdominal pain,denies heartburn, denies constipation or diarrhea  : denies dysuria, pelvic pain, vaginal  discharge or bleeding  Musculoskeletal: denies back or joint pain  Neuro denies headaches or dizziness  Psych: denies depression or anxiety    Physical Exam:   Pulse:  [] 94  BP: (129-149)/(85-95) 139/93    Constitutional: alert, appears stated age, and cooperative  Skin: no lesions or erythema  Respiratory: clear, unlabored  Cardiac: regular rate and rhythm   Abdomen: soft, non-tender, EFW 8#, presentation cephalic, uterine contractions rare with irritability  Fetal Surveillance:  145 BPM; Fetal heart variability: moderate  Fetal Heart Rate decelerations: none  Fetal Heart Rate accelerations: yes  Pelvis: Gynecoid  External Genitalia:  No lesions  Sterile vaginal exam: deferred  1/50/high per CNM in office . Cephalic confirmed with limited s ultrasound for presentation only  Extremities: extremities normal, atraumatic, no cyanosis or edema  Musculoskeletal: steady gait  Psych:  Appropriate affect and speech    Results:     Prenatal Results       Initial       Test Value Reference Range Date Time    Blood Type (ABO Group) ^ A   06/28/23     Rh Factor ^ Positive   06/28/23     Antibody Screen (Required questions in OE to answer) ^ Negative   06/28/23     HCT ^ 36.1   06/28/23     HGB ^ 12.0   06/28/23     MCV ^ 92.9   06/28/23     Platelets ^ 326   06/28/23     Rubella  Equivocal  Positive 01/02/24 1205    TREP        RPR ([facility])        Urine Culture        Hepatitis B  Nonreactive  Nonreactive  01/02/24 1205    HIV Combo        HCV                  Optional Initial Labs       Test Value Reference Range Date Time    TSH        Pap Smear        HPV        GC DNA        Chlamydia DNA        GTT 1 Hr        Glucose Fasting        Glucose 1 Hr        Glucose 2 Hr        Glucose 3 Hr        HgB A1c ^ 5.4 %  06/28/23     Vitamin D                  8-20 Weeks       Test Value Reference Range Date Time    1st Trimester Aneuploidy Risk Assessment        Quad - Down Screen Risk Estimate - prior to 02/20/18        Quad -  Down Maternal Age Risk - prior to 18        Quad - Trisomy 18 screen Risk Estimate - prior to 18        AFP Spina Bifida (Required questions in OE to answer )        QUAD/AFP - Interpretation        Free Fetal DNA         Genetic testing        Genetic testing        Genetic testing        GC DNA ^ Negative   23     Chlamydia DNA ^ Negative   23               24-28 Weeks       Test Value Reference Range Date Time    HCT ^ 35.2   10/11/23       ^ 35.2   10/11/23     HGB ^ 11.9   10/11/23       ^ 11.9   10/11/23     Platelets        GTT 1 Hr ^ 122   10/11/23       ^ 122   10/11/23     Glucose Fasting        Glucose 1 Hr        Glucose 2 Hr        Glucose 3 Hr        TSH         Profile        Urine Culture        GC DNA        Chlamydia DNA                  35-37 Weeks       Test Value Reference Range Date Time    HCT  34.4 % 35.0 - 48.0 24 1226       36.3 % 35.0 - 48.0 24 1710    HGB  11.4 g/dL 12.0 - 16.0 24 1226       12.1 g/dL 12.0 - 16.0 24 1710    Platelets  265.0 10(3)uL 150.0 - 450.0 24 1226       280.0 10(3)uL 150.0 - 450.0 24 1710    TREP  Nonreactive  Nonreactive  24 1205    RPR ([facility])        Genital Group B Culture  Positive  Negative 24 1138    TSH        Urine Culture  <10,000 cfu/ml Mixture of Gram positive organisms isolated - probable contamination.   24 1140    HIV Combo  Non-Reactive  Non-Reactive 24 1205    GC DNA        Chlamydia DNA                  Optional Labs       Test Value Reference Range Date Time    HgB A1c ^ 5.4 %  23     HGB Electrophoresis  (See Report)    24 1205    Varicella Zoster  156.20  >165.00 24 1205    Cystic Fibrosis-Old         Cystic Fibrosis[32] (Required questions in OE to answer)        Cystic Fibrosis[165] (Required questions in OE to answer)        Cystic Fibrosis[165] (Required questions in OE to answer)        Cystic Fibrosis[165] (Required questions in OE  to answer)        Sickle Cell        24Hr Urine Protein        24Hr Urine Creatinine        Parvo B19 IgM        Parvo B19 IgG                  Legend    ^: Historical                            Reviewed all prenatal ultrasounds  Component      Latest Ref Rng 1/24/2024   WBC      4.0 - 11.0 x10(3) uL 11.1 (H)    RBC      3.80 - 5.30 x10(6)uL 3.77 (L)    Hemoglobin      12.0 - 16.0 g/dL 11.4 (L)    Hematocrit      35.0 - 48.0 % 34.4 (L)    MCV      80.0 - 100.0 fL 91.2    MCH      26.0 - 34.0 pg 30.2    MCHC      31.0 - 37.0 g/dL 33.1    RDW-SD      35.1 - 46.3 fL 47.5 (H)    RDW      11.0 - 15.0 % 14.3    Platelet Count      150.0 - 450.0 10(3)uL 265.0    Prelim Neutrophil Abs      1.50 - 7.70 x10 (3) uL 7.38    Neutrophils Absolute      1.50 - 7.70 x10(3) uL 7.38    Lymphocytes Absolute      1.00 - 4.00 x10(3) uL 2.77    Monocytes Absolute      0.10 - 1.00 x10(3) uL 0.74    Eosinophils Absolute      0.00 - 0.70 x10(3) uL 0.09    Basophils Absolute      0.00 - 0.20 x10(3) uL 0.03    Immature Granulocyte Absolute      0.00 - 1.00 x10(3) uL 0.04    Neutrophils %      % 66.7    Lymphocytes %      % 25.1    Monocytes %      % 6.7    Eosinophils %      % 0.8    Basophils %      % 0.3    Immature Granulocyte %      % 0.4    Glucose      70 - 99 mg/dL 85    Sodium      136 - 145 mmol/L 136    Potassium      3.5 - 5.1 mmol/L 4.0    Chloride      98 - 112 mmol/L 107    Carbon Dioxide, Total      21.0 - 32.0 mmol/L 20.0 (L)    ANION GAP      0 - 18 mmol/L 9    BUN      9 - 23 mg/dL 7 (L)    CREATININE      0.55 - 1.02 mg/dL 0.64    BUN/CREATININE RATIO      10.0 - 20.0  10.9    CALCIUM      8.7 - 10.4 mg/dL 9.2    CALCULATED OSMOLALITY      275 - 295 mOsm/kg 279    EGFR      >=60 mL/min/1.73m2 122    ALT (SGPT)      10 - 49 U/L 29    AST (SGOT)      <=34 U/L 24    ALKALINE PHOSPHATASE      37 - 98 U/L 178 (H)    Total Bilirubin      0.3 - 1.2 mg/dL 0.3    PROTEIN, TOTAL      5.7 - 8.2 g/dL 6.9    Albumin      3.2 - 4.8 g/dL  3.8    Globulin      2.8 - 4.4 g/dL 3.1    A/G Ratio      1.0 - 2.0  1.2    Patient Fasting for CMP? No    TOTAL PROTEIN URINE RANDOM      <14.0 mg/dL <6.0    CREATININE UR RANDOM      mg/dL 16.50    Urine Protein/Creatinine Ratio, Random --         Assessment/Plan:   Ernestine is 28 year old and  with current gestational age of 39w3d and estimated due date of 2024, by 12 week Ultrasound as LMP uncertain    Not in labor.  Category 1 FHR tracing  Obstetrical history significant for GBS colonizer, Rubella non immune, Gestational HTN .    Admission problem(s):      Gestational hypertension, third trimester  -Admit for IOL 2nd gHTN at term  -Mildly elevated b/p's  -labs normal  -Cytotec for cervical ripening      Rubella non-immune status, antepartum  -MMR PP      Group B streptococcal carriage complicating pregnancy  -Start Ampicillin when in laor                Risks, benefits, alternatives and possible complications have been discussed in detail with the patient.   Pre-admission, admission, and post admission procedures and expectations were discussed in detail.    All questions answered, all appropriate consents will be signed at the Hospital. Admission is planned for today.   Intervention: misoprostol .    Gloria Butler CNM  2024  6:37 PM

## 2024-01-25 NOTE — PLAN OF CARE
Problem: SAFETY ADULT - FALL  Goal: Free from fall injury  Description: INTERVENTIONS:  - Assess pt frequently for physical needs  - Identify cognitive and physical deficits and behaviors that affect risk of falls.  - [place] fall precautions as indicated by assessment.  - Educate pt/family on patient safety including physical limitations  - Instruct pt to call for assistance with activity based on assessment  - Modify environment to reduce risk of injury  - Provide assistive devices as appropriate  - Consider OT/PT consult to assist with strengthening/mobility  - Encourage toileting schedule  Outcome: Progressing     Problem: BIRTH - VAGINAL/ SECTION  Goal: Fetal and maternal status remain reassuring during the birth process  Description: INTERVENTIONS:  - Monitor vital signs  - Monitor fetal heart rate  - Monitor uterine activity  - Monitor labor progression (vaginal delivery)  - DVT prophylaxis (C/S delivery)  - Surgical antibiotic prophylaxis (C/S delivery)  Outcome: Progressing     Problem: PAIN - ADULT  Goal: Verbalizes/displays adequate comfort level or patient's stated pain goal  Description: INTERVENTIONS:  - Encourage pt to monitor pain and request assistance  - Assess pain using appropriate pain scale  - Administer analgesics based on type and severity of pain and evaluate response  - Implement non-pharmacological measures as appropriate and evaluate response  - Consider cultural and social influences on pain and pain management  - Manage/alleviate anxiety  - Utilize distraction and/or relaxation techniques  - Monitor for opioid side effects  - Notify MD/LIP if interventions unsuccessful or patient reports new pain  - Anticipate increased pain with activity and pre-medicate as appropriate  Outcome: Progressing     Problem: ANXIETY  Goal: Will report anxiety at manageable levels  Description: INTERVENTIONS:  - Administer medication as ordered  - Teach and rehearse alternative coping skills  -  Provide emotional support with 1:1 interaction with staff  Outcome: Progressing     Problem: Patient Centered Care  Goal: Patient preferences are identified and integrated in the patient's plan of care  Description: Interventions:  - What would you like us to know as we care for you? I am expecting a boy  - Provide timely, complete, and accurate information to patient/family  - Incorporate patient and family knowledge, values, beliefs, and cultural backgrounds into the planning and delivery of care  - Encourage patient/family to participate in care and decision-making at the level they choose  - Honor patient and family perspectives and choices  Outcome: Progressing     Problem: Patient/Family Goals  Goal: Patient/Family Long Term Goal  Description: Patient's Long Term Goal: successful breast feeding and healthy weight gain of infant    Interventions:  - assess lactation needs and order lactation consult   - See additional Care Plan goals for specific interventions  Outcome: Progressing  Goal: Patient/Family Short Term Goal  Description: Patient's Short Term Goal: vaginal delivery    Interventions:   - continue to monitor induction and ambulate  - See additional Care Plan goals for specific interventions  Outcome: Progressing

## 2024-01-25 NOTE — PROGRESS NOTES
[facility]    Labor Progress Note    Ernestine Huff Patient Status:  Inpatient    1995 MRN [id number]   Location [facility] Attending Gloria Butler CNM   Hosp Day # 1 PCP No primary care provider on file.     Subjective:   Interval History:   Ernestine is up ambulating in room. Was using birthing ball. She is requesting an epidural at this time.    Objective:   Vital signs in last 24 hours:  Temp:  [98 °F (36.7 °C)-98.5 °F (36.9 °C)] 98.3 °F (36.8 °C)  Pulse:  [] 99  Resp:  [16-18] 18  BP: (120-139)/(80-93) 126/89    Input/Output:    Intake/Output Summary (Last 24 hours) at 2024 1238  Last data filed at 2024 0920  Gross per 24 hour   Intake 1725 ml   Output 160 ml   Net 1565 ml       Fetal Surveillance:   Fetal Heart Tones (FHTs): 135 with moderate variability, accelerations present, decelerations absent  Uterine contractions (Ucs): remain q1-3 minutes    Sterile Vaginal Exam (SVE):   Cook's remains in place      Results:   Lab Results   Component Value Date    TREPONEMALAB Nonreactive 2024    ABO A 2024    RH Positive 2024    WBC 11.1 (H) 2024    HGB 11.4 (L) 2024    HCT 34.4 (L) 2024    .0 2024    CREATSERUM 0.64 2024    BUN 7 (L) 2024     2024    K 4.0 2024     2024    CO2 20.0 (L) 2024    GLU 85 2024    CA 9.2 2024    ALB 3.8 2024    ALKPHO 178 (H) 2024    BILT 0.3 2024    TP 6.9 2024    AST 24 2024    ALT 29 2024       No results found for: \"DDIMER\", \"ESRML\", \"ESRPF\", \"CRP\", \"BNP\", \"MG\", \"PHOS\", \"TROP\", \"TROPHS\", \"CK\", \"CKMB\", \"PROSPER\", \"RPR\", \"B12\", \"ETOH\", \"COLORUR\", \"CLARITY\", \"SPECGRAVITY\", \"PROUR\", \"GLUUR\", \"KETUR\", \"BILUR\", \"BLOODURINE\", \"NITRITE\", \"UROBILINOGEN\", \"LEUUR\", \"UASA\", \"POCGLU\", \"BLDCUL\", \"BLDSMR\"    Assessment & Plan:     Pregnancy - Induction of labor  Andela is 28 year old,  , with current EGA of 39w4d, here for induction of labor due to gestational hypertension  Category 1 FHR tracing  Cook's in place and frequent contractions  Pt may have epidural  Anticipate        Gestational hypertension, third trimester  BP's normal today  Pt is asymptomatic       Group B streptococcal carriage complicating pregnancy  Antibiotic prophylaxis initiated       Rubella and Varicella non-immune  Will offer postpartum vaccinations    Plan discussed with patient who verbalizes understanding and agreement.    Gini Louise CNM  2024

## 2024-01-25 NOTE — PROGRESS NOTES
[facility]    Labor Progress Note    Ernestine Huff Patient Status:  Inpatient    1995 MRN [id number]   Location [facility] Attending Gloria Butler CNM   Hosp Day # 1 PCP No primary care provider on file.     Subjective:   Interval History:   Ernestine reports she is very comfortable after epidural placement. Amenable to AROM at this time.    Objective:   Vital signs in last 24 hours:  Temp:  [98 °F (36.7 °C)-98.5 °F (36.9 °C)] 98.3 °F (36.8 °C)  Pulse:  [] 99  Resp:  [16-18] 18  BP: (120-138)/(80-89) 126/89    Input/Output:    Intake/Output Summary (Last 24 hours) at 2024 1439  Last data filed at 2024 0920  Gross per 24 hour   Intake 1725 ml   Output 160 ml   Net 1565 ml       Fetal Surveillance:   Fetal Heart Tones (FHTs): 135 with moderate variability, accelerations present, decelerations absent  Uterine contractions (Ucs):q2-5 minutes    Sterile Vaginal Exam (SVE):   4/80/-2, clear fluid upon AROM    Other Measures:   Epidural: infusing  Regan: to be placed now    Results:   Lab Results   Component Value Date    TREPONEMALAB Nonreactive 2024    ABO A 2024    RH Positive 2024    WBC 11.1 (H) 2024    HGB 11.4 (L) 2024    HCT 34.4 (L) 2024    .0 2024    CREATSERUM 0.64 2024    BUN 7 (L) 2024     2024    K 4.0 2024     2024    CO2 20.0 (L) 2024    GLU 85 2024    CA 9.2 2024    ALB 3.8 2024    ALKPHO 178 (H) 2024    BILT 0.3 2024    TP 6.9 2024    AST 24 2024    ALT 29 2024       No results found for: \"DDIMER\", \"ESRML\", \"ESRPF\", \"CRP\", \"BNP\", \"MG\", \"PHOS\", \"TROP\", \"TROPHS\", \"CK\", \"CKMB\", \"PROSPER\", \"RPR\", \"B12\", \"ETOH\", \"COLORUR\", \"CLARITY\", \"SPECGRAVITY\", \"PROUR\", \"GLUUR\", \"KETUR\", \"BILUR\", \"BLOODURINE\", \"NITRITE\", \"UROBILINOGEN\", \"LEUUR\", \"UASA\", \"POCGLU\", \"BLDCUL\", \"BLDSMR\"    Assessment & Plan:      Pregnancy - Induction of labor  Andela is 28 year old, , with current EGA of 39w4d, here for induction of labor due to gestational hypertension  Category 1 FHR tracing  Cook's out/AROM for clear fluid  Anticipate        Gestational hypertension, third trimester  BP's normal today  Pt is asymptomatic       Group B streptococcal carriage complicating pregnancy  Antibiotic prophylaxis in progress       Rubella and Varicella non-immune  Will offer postpartum vaccinations      Plan discussed with patient who verbalizes understanding and agreement.    Gini Louise CNM  2024

## 2024-01-25 NOTE — PROGRESS NOTES
[facility]    Labor Progress Note    Ernestine Huff Patient Status:  Inpatient    1995 MRN [id number]   Location [facility] Attending Gloria Butler CNM   Hosp Day # 1 PCP No primary care provider on file.     Subjective:   Interval History:   Ernestine reports she was able to get some sleep overnight. Has had breakfast and a shower and is amenable to Cook's placement at this time.  She denies headache, vision changes, or epigastric pain. Her  has been present for support.    Objective:   Vital signs in last 24 hours:  Temp:  [98 °F (36.7 °C)-98.5 °F (36.9 °C)] 98 °F (36.7 °C)  Pulse:  [] 86  Resp:  [16] 16  BP: (120-149)/(80-95) 120/80    Input/Output:    Intake/Output Summary (Last 24 hours) at 2024 0905  Last data filed at 2024 0810  Gross per 24 hour   Intake 1725 ml   Output --   Net 1725 ml       Fetal Surveillance:   Fetal Heart Tones (FHTs): 130 with moderate variability, accelerations present, decelerations absent  Uterine contractions (Ucs): q1-3 minutes, mild    Sterile Vaginal Exam (SVE):   1-2/90/-2, fetal head well-applied to cervix    Other Measures:   Estimated Fetal Weight (EFW): 3400g  Pelvis: gynecoid      Results:   Lab Results   Component Value Date    TREPONEMALAB Nonreactive 2024    ABO A 2024    RH Positive 2024    WBC 11.1 (H) 2024    HGB 11.4 (L) 2024    HCT 34.4 (L) 2024    .0 2024    CREATSERUM 0.64 2024    BUN 7 (L) 2024     2024    K 4.0 2024     2024    CO2 20.0 (L) 2024    GLU 85 2024    CA 9.2 2024    ALB 3.8 2024    ALKPHO 178 (H) 2024    BILT 0.3 2024    TP 6.9 2024    AST 24 2024    ALT 29 2024       No results found for: \"DDIMER\", \"ESRML\", \"ESRPF\", \"CRP\", \"BNP\", \"MG\", \"PHOS\", \"TROP\", \"TROPHS\", \"CK\", \"CKMB\", \"PROSPER\", \"RPR\", \"B12\", \"ETOH\", \"COLORUR\", \"CLARITY\",  \"SPECGRAVITY\", \"PROUR\", \"GLUUR\", \"KETUR\", \"BILUR\", \"BLOODURINE\", \"NITRITE\", \"UROBILINOGEN\", \"LEUUR\", \"UASA\", \"POCGLU\", \"BLDCUL\", \"BLDSMR\"    Assessment & Plan:     Pregnancy - Induction of labor  Andela is 28 year old, , with current EGA of 39w4d, here for induction of labor due to gestational hypertension  Category 1 FHR tracing  Good change with cytotec overnight  Cook's placed with ease. Will monitor contraction pattern/may initiate pitocin  Pain management options reviewed. Pt may have IV pain meds or epidural if/when desires  Anticipate       Gestational hypertension, third trimester  Elevated BP in office yesterday that continued in Triage  Pre-eclamptic labs negative  Pt admitted yesterday for induction of labor      Group B streptococcal carriage complicating pregnancy  Will initiate antibiotic prophylaxis when in active labor      Rubella non-immune status, antepartum  Will offer postpartum vaccination      Susceptible to varicella (non-immune), currently pregnant  Will offer postpartum vaccination      Plan discussed with patient who verbalizes understanding and agreement.    Gini Louise CNM  2024  Caring for patient until 1900

## 2024-01-25 NOTE — ANESTHESIA PREPROCEDURE EVALUATION
Anesthesia PreOp Note    HPI:     Ernestine Huff is a 28 year old female who presents for preoperative consultation requested by: * No surgeons listed *    Date of Surgery: 1/25/2024    * No procedures listed *  Indication: * No pre-op diagnosis entered *    Relevant Problems   No relevant active problems       NPO:                         History Review:  Patient Active Problem List    Diagnosis Date Noted    Encounter for induction of labor 01/25/2024    Gestational hypertension, third trimester 01/24/2024    Susceptible to varicella (non-immune), currently pregnant 01/07/2024    Group B streptococcal carriage complicating pregnancy 01/05/2024    Rubella non-immune status, antepartum 01/03/2024    Vaccine counseling 01/02/2024    Tetanus, diphtheria, and acellular pertussis (Tdap) vaccination declined 12/14/2023    Pregnancy 11/29/2023       History reviewed. No pertinent past medical history.    Past Surgical History:   Procedure Laterality Date    ABLTJ CRYOSURG W/US GDN EA FIBROADENOMA Bilateral 2014       Medications Prior to Admission   Medication Sig Dispense Refill Last Dose    prenatal vitamin with DHA 27-0.8-228 MG Oral Cap Take 1 capsule by mouth daily.   1/23/2024     Current Facility-Administered Medications Ordered in Epic   Medication Dose Route Frequency Provider Last Rate Last Admin    fentaNYL-bupivacaine 2 mcg/mL-0.125% in sodium chloride 0.9% 100 mL EPIDURAL infusion premix   Epidural Continuous Edgar Kamara MD        fentaNYL (Sublimaze) 50 mcg/mL injection 100 mcg  100 mcg Epidural Once Edgar Kamara MD        bupivacaine PF (Marcaine) 0.25% injection  20 mL Epidural Once Edgar Kamara MD        EPHEDrine (PF) 25 MG/5 ML injection 5 mg  5 mg Intravenous PRN Edgar Kamara MD        nalbuphine (Nubain) 10 mg/mL injection 2.5 mg  2.5 mg Intravenous Q15 Min PRN Edgar Kamara MD        dextrose in lactated ringers 5% infusion   Intravenous Continuous Carrie  LOLLY Castro        lactated ringers infusion   Intravenous PRN Gloria Butler  mL/hr at 01/25/24 1134 New Bag at 01/25/24 1134    lactated ringers IV bolus 500 mL  500 mL Intravenous PRN Gloria Butler  mL/hr at 01/25/24 0525 Rate Change at 01/25/24 0525    acetaminophen (Tylenol Extra Strength) tab 500 mg  500 mg Oral Q6H PRN Gloria Butler CNM        acetaminophen (Tylenol Extra Strength) tab 1,000 mg  1,000 mg Oral Q6H PRN Gloria Butler CNM        ibuprofen (Motrin) tab 600 mg  600 mg Oral Once PRN Gloria Butler CNM        ondansetron (Zofran) 4 MG/2ML injection 4 mg  4 mg Intravenous Q6H PRN Gloria Butler CNM        oxyTOCIN in sodium chloride 0.9% (Pitocin) 30 Units/500mL infusion premix  62.5-900 rodriguez-units/min Intravenous Continuous Gloria Butler CNM        terbutaline (Brethine) 1 MG/ML injection 0.25 mg  0.25 mg Subcutaneous PRN Gloria Butler CNM        sodium citrate-citric acid (Bicitra) 500-334 MG/5ML oral solution 30 mL  30 mL Oral PRN Gloria Butler CNM        lidocaine PF (Xylocaine-MPF) 1% injection  30 mL Intradermal Once Gloria Butler CNM        ampicillin (Omnipen) 1 g in sodium chloride 0.9% 100 mL IVPB-MBP  1 g Intravenous Q4H Gloria Butler CNM        nalbuphine (Nubain) 10 mg/mL injection 10 mg  10 mg Intramuscular Q6H PRN Gloria Butler CNM        And    hydrOXYzine 50 mg/mL injection 50 mg  50 mg Intramuscular Q6H PRN Gloria Butler CNM        fentaNYL (Sublimaze) 50 mcg/mL injection 100 mcg  100 mcg Intravenous Once Gloria Butler CNM        fentaNYL (Sublimaze) 50 mcg/mL injection 50 mcg  50 mcg Intravenous Q30 Min PRN Gloria Butler CNM         No current Epic-ordered outpatient medications on file.       No Known Allergies    Family History   Problem Relation Age of Onset    Hypertension Father      Social History     Socioeconomic History    Marital status: Unknown   Tobacco  Use    Smoking status: Never    Smokeless tobacco: Never   Substance and Sexual Activity    Alcohol use: Never    Drug use: Never       Available pre-op labs reviewed.  Lab Results   Component Value Date    WBC 11.1 (H) 01/24/2024    RBC 3.77 (L) 01/24/2024    HGB 11.4 (L) 01/24/2024    HCT 34.4 (L) 01/24/2024    MCV 91.2 01/24/2024    MCH 30.2 01/24/2024    MCHC 33.1 01/24/2024    RDW 14.3 01/24/2024    .0 01/24/2024     Lab Results   Component Value Date     01/24/2024    K 4.0 01/24/2024     01/24/2024    CO2 20.0 (L) 01/24/2024    BUN 7 (L) 01/24/2024    CREATSERUM 0.64 01/24/2024    GLU 85 01/24/2024    CA 9.2 01/24/2024          Vital Signs:  Body mass index is 30.7 kg/m².   height is 1.702 m (5' 7\") and weight is 88.9 kg (196 lb). Her oral temperature is 98.3 °F (36.8 °C). Her blood pressure is 126/89 and her pulse is 99. Her respiration is 18.   Vitals:    01/24/24 2330 01/25/24 0345 01/25/24 0445 01/25/24 0915   BP: 129/87 120/80  126/89   Pulse: 80 86  99   Resp: 16 16  18   Temp: 98.5 °F (36.9 °C) 98 °F (36.7 °C)  98.3 °F (36.8 °C)   TempSrc: Oral Oral  Oral   Weight:   88.9 kg (196 lb)    Height:   1.702 m (5' 7\")         Anesthesia Evaluation      Airway   Mallampati: I  TM distance: >3 FB  Neck ROM: full  Dental      Pulmonary - normal exam   Cardiovascular - normal exam  (+) hypertension    Neuro/Psych      GI/Hepatic/Renal      Endo/Other    Abdominal   (+) obese                 Anesthesia Plan:   ASA:  2  Emergent    Plan:   Epidural  Informed Consent Plan and Risks Discussed With:  Patient      I have informed Ernestine Huff and/or legal guardian or family member of the nature of the anesthetic plan, benefits, risks including possible dental damage if relevant, major complications, and any alternative forms of anesthetic management.   All of the patient's questions were answered to the best of my ability. The patient desires the anesthetic management as planned.  PALLAVI  MD GIGI  1/25/2024 1:28 PM  Present on Admission:   Rubella non-immune status, antepartum   Group B streptococcal carriage complicating pregnancy   Gestational hypertension, third trimester   Susceptible to varicella (non-immune), currently pregnant   Encounter for induction of labor

## 2024-01-25 NOTE — PROGRESS NOTES
Pt is a 28 year old female admitted to LDR7/LDR7-A.     Chief Complaint   Patient presents with    Scheduled Induction     IOL for GHTN        Pt is  39w3d intra-uterine pregnancy.  History obtained, consents signed. Oriented to room, staff, and plan of care.

## 2024-01-25 NOTE — ANESTHESIA PROCEDURE NOTES
Labor Analgesia    Date/Time: 1/25/2024 1:32 PM    Performed by: Edgar Kamara MD  Authorized by: Edgar Kamara MD      General Information and Staff    Start Time:  1/25/2024 1:32 PM  End Time:  1/25/2024 1:47 PM  Anesthesiologist:  Edgar Kamara MD  Performed by:  Anesthesiologist  Patient Location:  OB  Site Identification: surface landmarks    Reason for Block: labor epidural    Preanesthetic Checklist: patient identified, IV checked, site marked, risks and benefits discussed, monitors and equipment checked, pre-op evaluation, timeout performed, anesthesia consent and sterile technique used      Procedure Details    Patient Position:  Sitting  Prep: ChloraPrep    Monitoring:  Heart rate  Approach:  Midline    Epidural Needle    Injection Technique:  LAURA saline  Needle Type:  Tuohy  Needle Gauge:  18 G  Needle Length:  3.5 in  Needle Insertion Depth:  6  Location:  L2-3    Spinal Needle      Catheter    Catheter Type:  Multi-orifice  Catheter Size:  20 G  Catheter at Skin Depth:  11  Test Dose:  Negative    Assessment    Sensory Level:  T10    Additional Comments

## 2024-01-25 NOTE — PROGRESS NOTES
[facility]    Labor Progress Note    Ernestine Huff Patient Status:  Inpatient    1995 MRN [id number]   Location [facility] Attending Gloria Butler [facility]   Hosp Day # 1 PCP No primary care provider on file.     Subjective:   Interval History:   Ernestine is in a lot of pain \"all over\". States the epidural bolus did not work. She is awaiting anesthesia to replace the catheter.    Objective:   Vital signs in last 24 hours:  Temp:  [98 °F (36.7 °C)-98.5 °F (36.9 °C)] 98.1 °F (36.7 °C)  Pulse:  [] 79  Resp:  [16-18] 18  BP: (113-151)/(64-92) 151/90  SpO2:  [96 %-100 %] 97 %    Input/Output:    Intake/Output Summary (Last 24 hours) at 2024 1753  Last data filed at 2024 1700  Gross per 24 hour   Intake 1725 ml   Output 1460 ml   Net 265 ml       Fetal Surveillance:   Fetal Heart Tones (FHTs): 130 with moderate variability, accelerations present, decelerations present - early  Uterine contractions (Ucs): q1-4 minutes    Sterile Vaginal Exam (SVE):   6/80/-2 per RN exam at 1715    Other Measures:   Pitocin at 6mUnits  Epidural: infusing but to be replaced  Regan: draining to gravity      Assessment & Plan:   Expand All Collapse All    [facility]     Labor Progress Note           Ernestine Huff Patient Status:  Inpatient    1995 MRN [id number]   Location [facility] Attending Gloria Butler, [facility]   Hosp Day # 1 PCP No primary care provider on file.         Subjective:   Interval History:   Ernestine reports she is very comfortable after epidural placement. Amenable to AROM at this time.           Objective:   Vital signs in last 24 hours:  Temp:  [98 °F (36.7 °C)-98.5 °F (36.9 °C)] 98.3 °F (36.8 °C)  Pulse:  [] 99  Resp:  [16-18] 18  BP: (120-138)/(80-89) 126/89     Input/Output:     Intake/Output Summary (Last 24 hours) at 2024 1439  Last data filed at 2024 0920      Gross per 24 hour    Intake 1725 ml   Output 160 ml   Net 1565 ml         Fetal Surveillance:   Fetal Heart Tones (FHTs): 135 with moderate variability, accelerations present, decelerations absent  Uterine contractions (Ucs):q2-5 minutes     Sterile Vaginal Exam (SVE):   4/80/-2, clear fluid upon AROM     Other Measures:   Epidural: infusing  Regan: to be placed now           Results:         Lab Results   Component Value Date     TREPONEMALAB Nonreactive 2024     ABO A 2024     RH Positive 2024     WBC 11.1 (H) 2024     HGB 11.4 (L) 2024     HCT 34.4 (L) 2024     .0 2024     CREATSERUM 0.64 2024     BUN 7 (L) 2024      2024     K 4.0 2024      2024     CO2 20.0 (L) 2024     GLU 85 2024     CA 9.2 2024     ALB 3.8 2024     ALKPHO 178 (H) 2024     BILT 0.3 2024     TP 6.9 2024     AST 24 2024     ALT 29 2024         No results found for: \"DDIMER\", \"ESRML\", \"ESRPF\", \"CRP\", \"BNP\", \"MG\", \"PHOS\", \"TROP\", \"TROPHS\", \"CK\", \"CKMB\", \"PROSPER\", \"RPR\", \"B12\", \"ETOH\", \"COLORUR\", \"CLARITY\", \"SPECGRAVITY\", \"PROUR\", \"GLUUR\", \"KETUR\", \"BILUR\", \"BLOODURINE\", \"NITRITE\", \"UROBILINOGEN\", \"LEUUR\", \"UASA\", \"POCGLU\", \"BLDCUL\", \"BLDSMR\"           Assessment & Plan:     Pregnancy - Induction of labor  Andela is 28 year old, , with current EGA of 39w4d, here for induction of labor due to gestational hypertension  Category 1 FHR tracing  Pitocin protocol 2 was initiated   Epidural to be replaced  Anticipate        Gestational hypertension, third trimester  Some mildly elevated BP's now while in pain and lying on the BP cuff  Pt is asymptomatic       Group B streptococcal carriage complicating pregnancy  Antibiotic prophylaxis in progress       Rubella and Varicella non-immune  Will offer postpartum vaccinations          Plan discussed with patient who verbalizes understanding and agreement.    Gini Louise,  CNM  1/25/2024

## 2024-01-26 PROCEDURE — 0HQ9XZZ REPAIR PERINEUM SKIN, EXTERNAL APPROACH: ICD-10-PCS | Performed by: ADVANCED PRACTICE MIDWIFE

## 2024-01-26 PROCEDURE — 59409 OBSTETRICAL CARE: CPT | Performed by: ADVANCED PRACTICE MIDWIFE

## 2024-01-26 RX ORDER — SIMETHICONE 80 MG
80 TABLET,CHEWABLE ORAL 3 TIMES DAILY PRN
Status: DISCONTINUED | OUTPATIENT
Start: 2024-01-26 | End: 2024-01-28

## 2024-01-26 RX ORDER — DOCUSATE SODIUM 100 MG/1
100 CAPSULE, LIQUID FILLED ORAL
Status: DISCONTINUED | OUTPATIENT
Start: 2024-01-26 | End: 2024-01-28

## 2024-01-26 RX ORDER — IBUPROFEN 600 MG/1
600 TABLET ORAL EVERY 6 HOURS
Status: DISCONTINUED | OUTPATIENT
Start: 2024-01-26 | End: 2024-01-28

## 2024-01-26 RX ORDER — BENZOCAINE/MENTHOL 6 MG-10 MG
1 LOZENGE MUCOUS MEMBRANE EVERY 6 HOURS PRN
Status: DISCONTINUED | OUTPATIENT
Start: 2024-01-26 | End: 2024-01-28

## 2024-01-26 RX ORDER — ACETAMINOPHEN 500 MG
1000 TABLET ORAL EVERY 6 HOURS PRN
Status: DISCONTINUED | OUTPATIENT
Start: 2024-01-26 | End: 2024-01-28

## 2024-01-26 RX ORDER — ONDANSETRON 2 MG/ML
4 INJECTION INTRAMUSCULAR; INTRAVENOUS EVERY 6 HOURS PRN
Status: DISCONTINUED | OUTPATIENT
Start: 2024-01-26 | End: 2024-01-28

## 2024-01-26 RX ORDER — CHOLECALCIFEROL (VITAMIN D3) 25 MCG
1 TABLET,CHEWABLE ORAL DAILY
Status: DISCONTINUED | OUTPATIENT
Start: 2024-01-26 | End: 2024-01-28

## 2024-01-26 RX ORDER — BISACODYL 10 MG
10 SUPPOSITORY, RECTAL RECTAL ONCE AS NEEDED
Status: DISCONTINUED | OUTPATIENT
Start: 2024-01-26 | End: 2024-01-28

## 2024-01-26 RX ORDER — AMMONIA INHALANTS 0.04 G/.3ML
0.3 INHALANT RESPIRATORY (INHALATION) AS NEEDED
Status: DISCONTINUED | OUTPATIENT
Start: 2024-01-26 | End: 2024-01-28

## 2024-01-26 RX ORDER — ACETAMINOPHEN 500 MG
500 TABLET ORAL EVERY 6 HOURS PRN
Status: DISCONTINUED | OUTPATIENT
Start: 2024-01-26 | End: 2024-01-28

## 2024-01-26 NOTE — ANESTHESIA POSTPROCEDURE EVALUATION
Patient: Ernestine Huff    Procedure Summary       Date: 01/25/24 Room / Location:     Anesthesia Start: 1332 Anesthesia Stop: 01/26/24 0147    Procedure: LABOR ANALGESIA Diagnosis:     Scheduled Providers:  Anesthesiologist: Edgar Kamara MD    Anesthesia Type: epidural ASA Status: 2 - Emergent            Anesthesia Type: epidural    Vitals Value Taken Time   /75 01/26/24 0245   Temp 98.1 °F (36.7 °C) 01/26/24 0200   Pulse 105 01/26/24 0245   Resp 18 01/26/24 0200   SpO2 96 % 01/26/24 0245   Vitals shown include unfiled device data.    [facility] AN Post Evaluation:   Patient Evaluated in PACU  Patient Participation: complete - patient participated  Level of Consciousness: awake  Pain Management: adequate  Airway Patency:patent  Dental exam unchanged from preop  Yes    Cardiovascular Status: acceptable  Respiratory Status: acceptable  Postoperative Hydration acceptable      EDGAR KAMARA MD  1/26/2024 6:11 AM

## 2024-01-26 NOTE — L&D DELIVERY NOTE
[facility]    Vaginal Delivery Note    Ernestine Huff Patient Status:  Inpatient    1995 MRN [id number]   Location [facility] Attending Ronit Seay CNM   Hosp Day # 2 PCP No primary care provider on file.     Delivery     Infant  Date of Delivery: 2024    Time of Delivery: 1:40 AM   Delivery Type: Normal spontaneous vaginal delivery     Infant Sex/Birthweight: male 7 lb 12.5 oz (3.53 kg)     Presentation Vertex [1]   Position   Occiput [1]  Anterior [1]     Apgars:  1 minute:  4               5 minutes:   9                       10 minutes:      Placenta  Date/Time of Delivery: 2024  1:47 AM    Delivery: spontaneous  Placenta to Pathology: no  Cord Gases Submitted: no  Cord Blood Collection: yes and blood collected for banking  Cord Tissue Collection: yes  Cord Complications: double nuchal  Sponge and Needle Counts:  Verified yes    Maternal Anesthesia: epidural   Episiotomy/Laceration Repair  Laceration: left labial first degree  Location: left  Suture Size/Type: 3-0 Chromic  Anesthesia: no additional  Repair Comments: uncomplicated    Delivery Complications  none    Neonatologist Present: no  Delivery Comment: Called to bedside Dr Ortiz was present who arrived when the head had delivered He reduced the nuchal cord x 2 easily.  Infant brought to the warmer for resuscitation. Cord blood collected for registry.Placenta delivered Talley appears complete and intact.  3CV present.  First degree labial laceration repaired with 3.0  chromic in usual fashion.  Mother & baby in stable condition.    Intake/Output   QBL:  255ml      Ronit Seay CNM   2024  2:00 AM

## 2024-01-26 NOTE — PROGRESS NOTES
I was called to the room as in-house laborist due to precipitous delivery w/ RN in room.  When I arrived, infant head was delivered with tight nuchal cord x 2.  I reduced the nuchal cords and the cord was clamped x 2 and cut as patient's primary OB provider, Ronit Seay CNM, arrived in room seconds after me.  Infant was taken to the warmer, dried/suctioned and received stimulation, minimal positive pressure ventilation with baby responding and having a 5 minute apgar of 9.  Remainder of maternal care performed by Ronit Seay.    KIRIT GOODWIN MD, MD  2024  3:06 AM  On call Laborist 24

## 2024-01-26 NOTE — PROGRESS NOTES
Patient up to bathroom with assist x 2.  Voided 250ml at this time. Patient transferred to mother/baby room 375 per wheelchair in stable condition with baby and personal belongings.  Accompanied by significant other and staff.  Report given to Latisha mother/baby ELI.

## 2024-01-26 NOTE — CM/SW NOTE
SW self referral due to finances/WIC resources    SW met with patient  bedside.  SW confirmed face sheet contact as correct.    Baby boy/girl name:Baby boy Vukan  Date & time of delivery:1/25/24 @ 1:40am  Delivery method:Normal spontaneous vaginal delivery   Siblings age:n/a    Patient employed: Denied  Length of maternity leave:n/a    Father of baby employed:Yes  Length of paternity leave:Denied    Breast or formula feed:Breast and formula feed    Pediatrician:Dr. Pete  SW encouraged pt to schedule infant first appointment (usually within 48 hours of discharge) prior to pt discharge. Pt expressed understanding.     Infant Insurance:Medicaid  [employer] [facility] contacted:Yes    Mental Health History: Denied    Medications:n/a    Therapist:n/a    Psychiatrist:n/a    SW discussed signs, symptoms and risks associated with post partum depression & anxiety.  [facility] provided pt with PMAD resources.  Other resources provided:[facility], [employer] transportation and mental health resources.  [facility] specific resources.    Patient support system:Extended family    Patient denied current questions/needs from SW.    SW/CM to remain available for support and/or discharge planning.      Ingrid Willis, MSW, LSW  Social Work   Ext:#[phone]

## 2024-01-26 NOTE — PLAN OF CARE
Problem: SAFETY ADULT - FALL  Goal: Free from fall injury  Description: INTERVENTIONS:  - Assess pt frequently for physical needs  - Identify cognitive and physical deficits and behaviors that affect risk of falls.  - [place] fall precautions as indicated by assessment.  - Educate pt/family on patient safety including physical limitations  - Instruct pt to call for assistance with activity based on assessment  - Modify environment to reduce risk of injury  - Provide assistive devices as appropriate  - Consider OT/PT consult to assist with strengthening/mobility  - Encourage toileting schedule  Outcome: Progressing

## 2024-01-27 LAB
BASOPHILS # BLD AUTO: 0.04 X10(3) UL (ref 0–0.2)
BASOPHILS NFR BLD AUTO: 0.2 %
DEPRECATED RDW RBC AUTO: 49.7 FL (ref 35.1–46.3)
EOSINOPHIL # BLD AUTO: 0.17 X10(3) UL (ref 0–0.7)
EOSINOPHIL NFR BLD AUTO: 1 %
ERYTHROCYTE [DISTWIDTH] IN BLOOD BY AUTOMATED COUNT: 14.7 % (ref 11–15)
HCT VFR BLD AUTO: 30.2 %
HGB BLD-MCNC: 10 G/DL
IMM GRANULOCYTES # BLD AUTO: 0.09 X10(3) UL (ref 0–1)
IMM GRANULOCYTES NFR BLD: 0.5 %
LYMPHOCYTES # BLD AUTO: 3.26 X10(3) UL (ref 1–4)
LYMPHOCYTES NFR BLD AUTO: 18.4 %
MCH RBC QN AUTO: 30.7 PG (ref 26–34)
MCHC RBC AUTO-ENTMCNC: 33.1 G/DL (ref 31–37)
MCV RBC AUTO: 92.6 FL
MONOCYTES # BLD AUTO: 1.11 X10(3) UL (ref 0.1–1)
MONOCYTES NFR BLD AUTO: 6.3 %
NEUTROPHILS # BLD AUTO: 13.03 X10 (3) UL (ref 1.5–7.7)
NEUTROPHILS # BLD AUTO: 13.03 X10(3) UL (ref 1.5–7.7)
NEUTROPHILS NFR BLD AUTO: 73.6 %
PLATELET # BLD AUTO: 253 10(3)UL (ref 150–450)
RBC # BLD AUTO: 3.26 X10(6)UL
WBC # BLD AUTO: 17.7 X10(3) UL (ref 4–11)

## 2024-01-27 NOTE — PLAN OF CARE
Sat with patient to review plan of care. Discussed analgesic options and answered all questions. VSS. Breastfeeding on demand. Breastfeeding successfully. Voiding independently. Lochia is WNL. Uterus is firm.     Problem: SAFETY ADULT - FALL  Goal: Free from fall injury  Description: INTERVENTIONS:  - Assess pt frequently for physical needs  - Identify cognitive and physical deficits and behaviors that affect risk of falls.  - [place] fall precautions as indicated by assessment.  - Educate pt/family on patient safety including physical limitations  - Instruct pt to call for assistance with activity based on assessment  - Modify environment to reduce risk of injury  - Provide assistive devices as appropriate  - Consider OT/PT consult to assist with strengthening/mobility  - Encourage toileting schedule  Outcome: Progressing     Problem: PAIN - ADULT  Goal: Verbalizes/displays adequate comfort level or patient's stated pain goal  Description: INTERVENTIONS:  - Encourage pt to monitor pain and request assistance  - Assess pain using appropriate pain scale  - Administer analgesics based on type and severity of pain and evaluate response  - Implement non-pharmacological measures as appropriate and evaluate response  - Consider cultural and social influences on pain and pain management  - Manage/alleviate anxiety  - Utilize distraction and/or relaxation techniques  - Monitor for opioid side effects  - Notify MD/LIP if interventions unsuccessful or patient reports new pain  - Anticipate increased pain with activity and pre-medicate as appropriate  Outcome: Progressing     Problem: ANXIETY  Goal: Will report anxiety at manageable levels  Description: INTERVENTIONS:  - Administer medication as ordered  - Teach and rehearse alternative coping skills  - Provide emotional support with 1:1 interaction with staff  Outcome: Progressing     Problem: POSTPARTUM  Goal: Long Term Goal:Experiences normal postpartum  course  Description: INTERVENTIONS:  - Assess and monitor vital signs and lab values.  - Assess fundus and lochia.  - Provide ice/sitz baths for perineum discomfort.  - Monitor healing of incision/episiotomy/laceration, and assess for signs and symptoms of infection and hematoma.  - Assess bladder function and monitor for bladder distention.  - Provide/instruct/assist with pericare as needed.  - Provide VTE prophylaxis as needed.  - Monitor bowel function.  - Encourage ambulation and provide assistance as needed.  - Assess and monitor emotional status and provide social service/psych resources as needed.  - Utilize standard precautions and use personal protective equipment as indicated. Ensure aseptic care of all intravenous lines and invasive tubes/drains.  - Obtain immunization and exposure to communicable diseases history.  Outcome: Progressing  Goal: Optimize infant feeding at the breast  Description: INTERVENTIONS:  - Initiate breast feeding within first hour after birth.   - Monitor effectiveness of current breast feeding efforts.  - Assess support systems available to mother/family.  - Identify cultural beliefs/practices regarding lactation, letdown techniques, maternal food preferences.  - Assess mother's knowledge and previous experience with breast feeding.  - Provide information as needed about early infant feeding cues (e.g., rooting, lip smacking, sucking fingers/hand) versus late cue of crying.  - Discuss/demonstrate breast feeding aids (e.g., infant sling, nursing footstool/pillows, and breast pumps).  - Encourage mother/other family members to express feelings/concerns, and actively listen.  - Educate father/SO about benefits of breast feeding and how to manage common lactation challenges.  - Recommend avoidance of specific medications or substances incompatible with breast feeding.  - Assess and monitor for signs of nipple pain/trauma.  - Instruct and provide assistance with proper latch.  - Review  techniques for milk expression (breast pumping) and storage of breast milk. Provide pumping equipment/supplies, instructions and assistance, as needed.  - Encourage rooming-in and breast feeding on demand.  - Encourage skin-to-skin contact.  - Provide LC support as needed.  - Assess for and manage engorgement.  - Provide breast feeding education handouts and information on community breast feeding support.   Outcome: Progressing  Goal: Establishment of adequate milk supply with medication/procedure interruptions  Description: INTERVENTIONS:  - Review techniques for milk expression (breast pumping).   - Provide pumping equipment/supplies, instructions, and assistance until it is safe to breastfeed infant.  Outcome: Progressing  Goal: Experiences normal breast weaning course  Description: INTERVENTIONS:  - Assess for and manage engorgement.  - Instruct on breast care.  - Provide comfort measures.  Outcome: Progressing  Goal: Appropriate maternal -  bonding  Description: INTERVENTIONS:  - Assess caregiver- interactions.  - Assess caregiver's emotional status and coping mechanisms.  - Encourage caregiver to participate in  daily care.  - Assess support systems available to mother/family.  - Provide /case management support as needed.  Outcome: Progressing

## 2024-01-27 NOTE — PROGRESS NOTES
[facility]    OB/GYNE Progress Note      Ernestine Huff Patient Status:  Inpatient    1995 MRN [id number]   Location [facility] 3SE Attending Ronit Seay CNM   Hosp Day # 3 PCP No primary care provider on file.     Assessment & Plan:     Gestational hypertension, third trimester  2 elevated bps postpartum 130s/90s. No severe range during hospital stay  Pt is asymptomatic       Rubella and Varicella non-immune status, antepartum  Ordered         (normal spontaneous vaginal delivery)  Stable         Discussed with:      nurse     patient and family     Plan discussed with patient who verbalizes understanding and agreement.    Subjective:   Patient is overall feeling well. Desires to go home today, we discussed she needed to another day for bp monitoring due to GHTN.     Review of Systems:  Constitutional: feeling well, tolerating diet well, lochia present, up in chair, and ambulating in room  Psychiatric: denies  Gynecological: lochia and no clots   Gastrointestinal: denies  Genitourinary: denies  Constitutional: denies  Eyes: denies blurry vision, changes in vision   Cardiovascular: reports edema in lower extremities   Respiratory: denies  Psychiatric: denies    Objective:   Vital signs in last 24 hours:  Temp:  [97.5 °F (36.4 °C)-98.7 °F (37.1 °C)] 97.5 °F (36.4 °C)  Pulse:  [] 80  Resp:  [16] 16  BP: (114-131)/(75-95) 122/89    Input/Output:  No intake or output data in the 24 hours ending 24 1110    Weight (Last 6):  Wt Readings from Last 6 Encounters:   24 196 lb (88.9 kg)   24 196 lb (88.9 kg)   24 193 lb (87.5 kg)   24 190 lb (86.2 kg)   24 188 lb (85.3 kg)   23 189 lb (85.7 kg)     Body mass index is 30.7 kg/m².            Constitutional: comfortable  Genitourinary: Labia: well approximated, no swelling  Uterus: fundus boggy  Gastrointestinal: normal appearance  Breast Exam: General appearance: normal  Nipple/Areola:  normal: bilateral  Cardiovascular: 2+ pedal edema present  Rectal: hemorrhoids  Extremities: No evidence of DVT seen on physical exam.    VTE Prophylaxis Ordered: no        Results:   Lab Results   Component Value Date    TREPONEMALAB Nonreactive 01/02/2024    ABO A 01/24/2024    RH Positive 01/24/2024    WBC 17.7 (H) 01/27/2024    HGB 10.0 (L) 01/27/2024    HCT 30.2 (L) 01/27/2024    .0 01/27/2024    CREATSERUM 0.64 01/24/2024    BUN 7 (L) 01/24/2024     01/24/2024    K 4.0 01/24/2024     01/24/2024    CO2 20.0 (L) 01/24/2024    GLU 85 01/24/2024    CA 9.2 01/24/2024    ALB 3.8 01/24/2024    ALKPHO 178 (H) 01/24/2024    BILT 0.3 01/24/2024    TP 6.9 01/24/2024    AST 24 01/24/2024    ALT 29 01/24/2024       No results found for: \"DDIMER\", \"ESRML\", \"ESRPF\", \"CRP\", \"BNP\", \"MG\", \"PHOS\", \"TROP\", \"TROPHS\", \"CK\", \"CKMB\", \"PROSPER\", \"RPR\", \"B12\", \"ETOH\", \"COLORUR\", \"CLARITY\", \"SPECGRAVITY\", \"PROUR\", \"GLUUR\", \"KETUR\", \"BILUR\", \"BLOODURINE\", \"NITRITE\", \"UROBILINOGEN\", \"LEUUR\", \"UASA\", \"POCGLU\", \"BLDCUL\", \"BLDSMR\"    No results found.         Ronit Seay CNM  1/27/2024  11:10 AM

## 2024-01-28 VITALS
BODY MASS INDEX: 30.76 KG/M2 | WEIGHT: 196 LBS | SYSTOLIC BLOOD PRESSURE: 137 MMHG | HEIGHT: 67 IN | OXYGEN SATURATION: 96 % | HEART RATE: 80 BPM | RESPIRATION RATE: 16 BRPM | TEMPERATURE: 98 F | DIASTOLIC BLOOD PRESSURE: 88 MMHG

## 2024-01-28 RX ORDER — LABETALOL 200 MG/1
200 TABLET, FILM COATED ORAL EVERY 12 HOURS SCHEDULED
Status: DISCONTINUED | OUTPATIENT
Start: 2024-01-28 | End: 2024-01-28

## 2024-01-28 RX ORDER — LABETALOL 200 MG/1
200 TABLET, FILM COATED ORAL EVERY 12 HOURS SCHEDULED
Qty: 60 TABLET | Refills: 0 | Status: SHIPPED | OUTPATIENT
Start: 2024-01-28

## 2024-01-28 NOTE — DISCHARGE INSTRUCTIONS
Pelvic rest for 6 weeks: nothing in the vagina (tampons, intercourse).   May shower, no bathtubs or swimming.    - Call OB for any concerns:     *Heavy bleeding that saturates one pad per hour for several hours/clots bigger than a golf ball     *Signs of preeclampsia (headache, vision changes, nausea/vomiting)     *Increased pain unrelieved by oral medications     *Anxiety or depression     *Breast concerns     *Fever 100.4 or greater     *Dizziness/fainting     *Calf pain, redness or swelling    Follow up with OB in 6 weeks.     Discharge Instructions for Vaginal Delivery  Follow-up  Call midwife if bp is >140/90. Follow up with a nurse visit in 3 days for a blood pressure check. Schedule a  follow-up exam with the midwife who delivered you in 2 weeks and 6 weeks. Please remember to call as soon as possible for this appointment. After having a baby, your body may be very tired. It can take time to recover from a vaginal delivery. Please remember this and call if you have any questions or concerns before your 6 week appointment.   Medications    Please take Motrin at home for pain control 600mg every 6 hours as needed  Continue taking your Prenatal Vitamin daily, as long as you are nursing  Ferrous Sulfate 325 mg once every other day (may obtain in form of Gentle Iron, Slow FE, or liquid Floradix)  Vitamin D3 2000 IU daily  Colace (stool softener)  once or twice per day as needed  If you have stitches  You may have received stitches in the skin near your vagina. The stitches might have closed an episiotomy (an incision that enlarges the opening of the vagina). Or you may have needed stitches to repair torn skin. Either way, your stitches should dissolve within weeks. Until then, you can help reduce discomfort, aid healing, and reduce your risk of infection by keeping the stitches clean. These tips can help:  Gently wipe from front to back after you urinate or have a bowel movement.  After wiping, spray warm  water on the area. Or you can have a sitz bath. This means sitting in a tub with a few inches of water in it. Then pat the area dry or use a hairdryer on a cool setting.  You can take a shower unless told not to.  Change sanitary pads at least every 2 to 4 hours.  Place cold packs as need, but remember to keep a thin towel between the pack and your skin.  Activity  Here are some suggestions:  Get lots of rest. Take naps as often as your can.   Increase your activities gradually.   Don’t have sexual intercourse until after you’ve had a follow-up appointment and you have decided on a birth control method.  Remember your are on pelvic rest, so nothing in your vagina (tampons etc...), no tub baths, and no swimming pools.       When to call your healthcare provider  Call your health care provider right away if you have:  A fever of 100.4°F (38.0°C) or higher  Bleeding that requires a new sanitary pad after an hour, or large blood clots. Remember your bleeding will wax and wane. Please call if you are consistently saturating a pad and hour.   Pain in your vagina that gets worse and isn't relieved with medicine.  Burning, pain, red streaks, or lumpy areas in your breasts that may be accompanied by flu-like symptoms  Nausea or vomiting  Dizziness or fainting  Feelings of extreme sadness or anxiety, or a feeling that you don’t want to be with your baby  Abdominal pain that isn’t relieved with medicine  No bowel movement for 5 days  Redness, warmth, or pain in the lower leg  Chest pain

## 2024-01-28 NOTE — PROGRESS NOTES
[facility]    OB/GYNE Progress Note      Ernestine Huff Patient Status:  Inpatient    1995 MRN [id number]   Location [facility] 3SE Attending Ronit Seay CNM   Hosp Day # 4 PCP No primary care provider on file.     Assessment & Plan:     Gestational hypertension, third trimester  Discharge with labetalol 200 BID, check bp at home twice a day       (normal spontaneous vaginal delivery)  Stable, bonding well with baby          Discussed with:      patient and spouse     Plan discussed with patient who verbalizes understanding and agreement.    Subjective:   Overall feeling well, denies HA, blurry vision or changes in vision,epigastric pain     Review of Systems:  Constitutional: Denies HA or visual changes  Gynecological: lochia, denies clots  Gastrointestinal: denies  Genitourinary: denies  Cardiovascular: edema, denies chest pain or RUQ pain  Psychiatric: denies    Objective:   Vital signs in last 24 hours:  Temp:  [98.2 °F (36.8 °C)-99.3 °F (37.4 °C)] 98.2 °F (36.8 °C)  Pulse:  [] 80  Resp:  [16] 16  BP: (123-137)/(83-96) 137/88    Input/Output:  No intake or output data in the 24 hours ending 24 1256    Weight (Last 6):  Wt Readings from Last 6 Encounters:   24 196 lb (88.9 kg)   24 196 lb (88.9 kg)   24 193 lb (87.5 kg)   24 190 lb (86.2 kg)   24 188 lb (85.3 kg)   23 189 lb (85.7 kg)     Body mass index is 30.7 kg/m².    Constitutional: comfortable  Genitourinary: light lochia, no clots, perineum without swelling or erythema  Uterus: enlarged, fundus firm, and fundus below umbilicus  Psychiatric: calm      VTE Prophylaxis Ordered: no    Results:   Lab Results   Component Value Date    TREPONEMALAB Nonreactive 2024    ABO A 2024    RH Positive 2024    WBC 17.7 (H) 2024    HGB 10.0 (L) 2024    HCT 30.2 (L) 2024    .0 2024    CREATSERUM 0.64 2024    BUN 7 (L) 2024      01/24/2024    K 4.0 01/24/2024     01/24/2024    CO2 20.0 (L) 01/24/2024    GLU 85 01/24/2024    CA 9.2 01/24/2024    ALB 3.8 01/24/2024    ALKPHO 178 (H) 01/24/2024    BILT 0.3 01/24/2024    TP 6.9 01/24/2024    AST 24 01/24/2024    ALT 29 01/24/2024       No results found for: \"DDIMER\", \"ESRML\", \"ESRPF\", \"CRP\", \"BNP\", \"MG\", \"PHOS\", \"TROP\", \"TROPHS\", \"CK\", \"CKMB\", \"PROSPER\", \"RPR\", \"B12\", \"ETOH\", \"COLORUR\", \"CLARITY\", \"SPECGRAVITY\", \"PROUR\", \"GLUUR\", \"KETUR\", \"BILUR\", \"BLOODURINE\", \"NITRITE\", \"UROBILINOGEN\", \"LEUUR\", \"UASA\", \"POCGLU\", \"BLDCUL\", \"BLDSMR\"    No results found.         Ronit Seay, LOLLY  1/28/2024  12:56 PM

## 2024-01-28 NOTE — PLAN OF CARE
Sat with patient to review plan of care. Discussed analgesic options and answered all questions. VSS. Breastfeeding on demand. Breastfeeding successfully. Voiding independently. Lochia is WNL. Uterus is firm.     Problem: SAFETY ADULT - FALL  Goal: Free from fall injury  Description: INTERVENTIONS:  - Assess pt frequently for physical needs  - Identify cognitive and physical deficits and behaviors that affect risk of falls.  - [place] fall precautions as indicated by assessment.  - Educate pt/family on patient safety including physical limitations  - Instruct pt to call for assistance with activity based on assessment  - Modify environment to reduce risk of injury  - Provide assistive devices as appropriate  - Consider OT/PT consult to assist with strengthening/mobility  - Encourage toileting schedule  Outcome: Progressing     Problem: PAIN - ADULT  Goal: Verbalizes/displays adequate comfort level or patient's stated pain goal  Description: INTERVENTIONS:  - Encourage pt to monitor pain and request assistance  - Assess pain using appropriate pain scale  - Administer analgesics based on type and severity of pain and evaluate response  - Implement non-pharmacological measures as appropriate and evaluate response  - Consider cultural and social influences on pain and pain management  - Manage/alleviate anxiety  - Utilize distraction and/or relaxation techniques  - Monitor for opioid side effects  - Notify MD/LIP if interventions unsuccessful or patient reports new pain  - Anticipate increased pain with activity and pre-medicate as appropriate  Outcome: Progressing     Problem: ANXIETY  Goal: Will report anxiety at manageable levels  Description: INTERVENTIONS:  - Administer medication as ordered  - Teach and rehearse alternative coping skills  - Provide emotional support with 1:1 interaction with staff  Outcome: Progressing      Problem: POSTPARTUM  Goal: Long Term Goal:Experiences normal postpartum  course  Description: INTERVENTIONS:  - Assess and monitor vital signs and lab values.  - Assess fundus and lochia.  - Provide ice/sitz baths for perineum discomfort.  - Monitor healing of incision/episiotomy/laceration, and assess for signs and symptoms of infection and hematoma.  - Assess bladder function and monitor for bladder distention.  - Provide/instruct/assist with pericare as needed.  - Provide VTE prophylaxis as needed.  - Monitor bowel function.  - Encourage ambulation and provide assistance as needed.  - Assess and monitor emotional status and provide social service/psych resources as needed.  - Utilize standard precautions and use personal protective equipment as indicated. Ensure aseptic care of all intravenous lines and invasive tubes/drains.  - Obtain immunization and exposure to communicable diseases history.  Outcome: Progressing  Goal: Optimize infant feeding at the breast  Description: INTERVENTIONS:  - Initiate breast feeding within first hour after birth.   - Monitor effectiveness of current breast feeding efforts.  - Assess support systems available to mother/family.  - Identify cultural beliefs/practices regarding lactation, letdown techniques, maternal food preferences.  - Assess mother's knowledge and previous experience with breast feeding.  - Provide information as needed about early infant feeding cues (e.g., rooting, lip smacking, sucking fingers/hand) versus late cue of crying.  - Discuss/demonstrate breast feeding aids (e.g., infant sling, nursing footstool/pillows, and breast pumps).  - Encourage mother/other family members to express feelings/concerns, and actively listen.  - Educate father/SO about benefits of breast feeding and how to manage common lactation challenges.  - Recommend avoidance of specific medications or substances incompatible with breast feeding.  - Assess and monitor for signs of nipple pain/trauma.  - Instruct and provide assistance with proper latch.  - Review  techniques for milk expression (breast pumping) and storage of breast milk. Provide pumping equipment/supplies, instructions and assistance, as needed.  - Encourage rooming-in and breast feeding on demand.  - Encourage skin-to-skin contact.  - Provide LC support as needed.  - Assess for and manage engorgement.  - Provide breast feeding education handouts and information on community breast feeding support.   Outcome: Progressing  Goal: Establishment of adequate milk supply with medication/procedure interruptions  Description: INTERVENTIONS:  - Review techniques for milk expression (breast pumping).   - Provide pumping equipment/supplies, instructions, and assistance until it is safe to breastfeed infant.  Outcome: Progressing  Goal: Experiences normal breast weaning course  Description: INTERVENTIONS:  - Assess for and manage engorgement.  - Instruct on breast care.  - Provide comfort measures.  Outcome: Progressing  Goal: Appropriate maternal -  bonding  Description: INTERVENTIONS:  - Assess caregiver- interactions.  - Assess caregiver's emotional status and coping mechanisms.  - Encourage caregiver to participate in  daily care.  - Assess support systems available to mother/family.  - Provide /case management support as needed.  Outcome: Progressing

## 2024-01-28 NOTE — PLAN OF CARE

## 2024-01-28 NOTE — DISCHARGE SUMMARY
[facility]    Discharge Summary    Ernestine Huff Patient Status:  Inpatient    1995 MRN [id number]   Location [facility] 3SE Attending Ronit Seay CNM   Hosp Day # 4       Delivering OB Clinician: Ronit Seay CNM    EDC: Estimated Date of Delivery: 24    Gestational Age: 39w5d    Antepartum complications:   Patient Active Problem List   Diagnosis    Pregnancy    Tetanus, diphtheria, and acellular pertussis (Tdap) vaccination declined    Vaccine counseling    Rubella non-immune status, antepartum    Group B streptococcal carriage complicating pregnancy    Susceptible to varicella (non-immune), currently pregnant    Gestational hypertension, third trimester    Encounter for induction of labor     (normal spontaneous vaginal delivery)       Date of Delivery: 2024  Time of Delivery: 1:40 AM     Delivery Type: spontaneous vaginal delivery    Baby: Liveborn male   Information for the patient's :  Jack Huff [[id number]]   7 lb 12.5 oz (3.53 kg)   Apgars:  1 minute: 4   5 minutes: 9 10 minutes:       Intrapartum Complications: PIH    Admit Date: 2024    Discharge Date: 2024    Hospital Course: No complications Routine delivery and postpartum care    Discharged Condition: stable    Disposition: home    Plan:     Follow-up appointment in 3 days for bp check and in 2 weeks with LOLLY Martins CNM  2024  1:06 PM

## 2024-01-29 NOTE — PAYOR COMM NOTE
--------------  CONTINUED STAY REVIEW  1/26-1/28  Payor: [facility]  Subscriber #:  [id number]  Authorization Number: [id number]    Admit date: 1/24/24  Admit time:  5:42 PM    Admitting Physician: Gloria Wallace MD  Attending Physician:  No att. providers found  Primary Care Physician: No primary care provider on file.    REVIEW DOCUMENTATION:    1/26 Vaginal Delivery Note     Delivery      Infant  Date of Delivery: 1/26/2024    Time of Delivery: 1:40 AM   Delivery Type: Normal spontaneous vaginal delivery      Infant Sex/Birthweight: male 7 lb 12.5 oz (3.53 kg)      Presentation Vertex [1]   Position   Occiput [1]  Anterior [1]      Apgars:  1 minute:  4               5 minutes:   9                       10 minutes:       Placenta  Date/Time of Delivery: 1/26/2024  1:47 AM    Delivery: spontaneous  Placenta to Pathology: no  Cord Gases Submitted: no  Cord Blood Collection: yes and blood collected for banking  Cord Tissue Collection: yes  Cord Complications: double nuchal  Sponge and Needle Counts:  Verified yes     Maternal Anesthesia: epidural   Episiotomy/Laceration Repair  Laceration: left labial first degree  Location: left  Suture Size/Type: 3-0 Chromic  Anesthesia: no additional  Repair Comments: uncomplicated     Delivery Complications  none     Neonatologist Present: no  Delivery Comment: Called to bedside Dr Ortiz was present who arrived when the head had delivered He reduced the nuchal cord x 2 easily.  Infant brought to the warmer for resuscitation. Cord blood collected for registry.Placenta delivered [place] appears complete and intact.  3CV present.  First degree labial laceration repaired with 3.0  chromic in usual fashion.  Mother & baby in stable condition.     Intake/Output   QBL:  255ml            1/27 OB/GYNE Progress Note   Gestational hypertension, third trimester  2 elevated bps postpartum 130s/90s. No severe range during hospital stay  Pt is asymptomatic        Rubella and Varicella non-immune status, antepartum  Ordered       (normal spontaneous vaginal delivery)  Stable        Discussed with:                                nurse                               patient and family      Plan discussed with patient who verbalizes understanding and agreement.  Subjective:   Patient is overall feeling well. Desires to go home today, we discussed she needed to another day for bp monitoring due to GHTN.      Review of Systems:  Constitutional: feeling well, tolerating diet well, lochia present, up in chair, and ambulating in room  Psychiatric: denies  Gynecological: lochia and no clots   Gastrointestinal: denies  Genitourinary: denies  Constitutional: denies  Eyes: denies blurry vision, changes in vision   Cardiovascular: reports edema in lower extremities   Respiratory: denies  Psychiatric: denies      Objective:   Vital signs in last 24 hours:  Temp:  [97.5 °F (36.4 °C)-98.7 °F (37.1 °C)] 97.5 °F (36.4 °C)  Pulse:  [] 80  Resp:  [16] 16  BP: (114-131)/(75-95) 122/89         Wt Readings from Last 6 Encounters:   24 196 lb (88.9 kg)   24 196 lb (88.9 kg)   24 193 lb (87.5 kg)   24 190 lb (86.2 kg)   24 188 lb (85.3 kg)   23 189 lb (85.7 kg)      Body mass index is 30.7 kg/m².       Constitutional: comfortable  Genitourinary: Labia: well approximated, no swelling  Uterus: fundus boggy  Gastrointestinal: normal appearance  Breast Exam: General appearance: normal  Nipple/Areola: normal: bilateral  Cardiovascular: 2+ pedal edema present  Rectal: hemorrhoids  Extremities: No evidence of DVT seen on physical exam.     VTE Prophylaxis Ordered: no      Results:         Lab Results   Component Value Date     WBC 17.7 (H) 2024     HGB 10.0 (L) 2024     HCT 30.2 (L) 2024     .0 2024       Vitals (last day) before discharge       Date/Time Temp Pulse Resp BP SpO2 Weight O2 Device O2 Flow Rate (L/min) Who     24 1119 -- 80 -- 137/88 -- -- -- -- MB    24 1100 -- 89 -- 126/96 -- -- -- -- MB    24 0810 98.2 °F (36.8 °C) 80 16 130/88 -- -- None (Room air) -- MB    24 0400 -- 75 -- 133/94 -- -- -- --     24 2358 -- 86 -- 135/93 -- -- -- --     24 2310 99.3 °F (37.4 °C) 81 16 130/91 -- -- None (Room air) --     24 1830 -- 87 -- 123/85 -- -- -- -- MB    24 1420 -- 102 -- 135/83 -- -- -- -- MB    24 1015 97.5 °F (36.4 °C) 80 16 122/89 -- -- None (Room air) -- MB    24 0504 -- 79 -- 117/86 -- -- -- --     24 0140 -- 84 -- 114/86 -- -- -- --                    --------------  DISCHARGE REVIEW    Payor: [facility]  Subscriber #:  [id number]  Authorization Number: [id number]    Admit date: 24  Admit time:   5:42 PM  Discharge Date: 2024  1:42 PM     Admitting Physician: Gloria Wallace MD  Attending Physician:  No att. providers found  Primary Care Physician: No primary care provider on file.          Discharge Summary Notes        Discharge Summary signed by Ronit Seay CNM at 2024  1:07 PM       Author: Ronit Seay CNM Specialty: Midwife, OBSTETRICS & GYNECOLOGY Author Type: Midwife    Filed: 2024  1:07 PM Date of Service: 2024  1:06 PM Status: Signed    : Ronit Seay CNM (Midwife)         [facility]    Discharge Summary    Ernestine Huff Patient Status:  Inpatient    1995 MRN [id number]   Location [facility] 3SE Attending Ronit Seay CNM   Hosp Day # 4       Delivering OB Clinician: Ronit Seay CNM    EDC: Estimated Date of Delivery: 24    Gestational Age: 39w5d    Antepartum complications:   Patient Active Problem List   Diagnosis    Pregnancy    Tetanus, diphtheria, and acellular pertussis (Tdap) vaccination declined    Vaccine counseling    Rubella non-immune status, antepartum    Group B streptococcal carriage complicating pregnancy     Susceptible to varicella (non-immune), currently pregnant    Gestational hypertension, third trimester    Encounter for induction of labor     (normal spontaneous vaginal delivery)       Date of Delivery: 2024  Time of Delivery: 1:40 AM     Delivery Type: spontaneous vaginal delivery    Baby: Liveborn male   Information for the patient's :  Jack Huff [[id number]]   7 lb 12.5 oz (3.53 kg)   Apgars:  1 minute: 4   5 minutes: 9 10 minutes:       Intrapartum Complications: PIH    Admit Date: 2024    Discharge Date: 2024    Hospital Course: No complications Routine delivery and postpartum care    Discharged Condition: stable    Disposition: home    Plan:     Follow-up appointment in 3 days for bp check and in 2 weeks with LOLLY Martins CNM  2024  1:06 PM        Electronically signed by Ronit Seay CNM on 2024  1:07 PM         REVIEWER COMMENTS

## 2024-01-30 ENCOUNTER — PATIENT OUTREACH (OUTPATIENT)
Dept: CASE MANAGEMENT | Age: 29
End: 2024-01-30

## 2024-01-30 NOTE — PROGRESS NOTES
[facility] Post Partum apt request (delivered 01/26)    Ronit Seay, CNM  [address]  [place] [address]  [phone]  2w virtual -   6w -   Pt declined apts; pt advised she will call when she is ready to make the apts  Closing encounter

## 2024-02-14 ENCOUNTER — POSTPARTUM (OUTPATIENT)
Dept: OBGYN CLINIC | Facility: CLINIC | Age: 29
End: 2024-02-14

## 2024-02-14 VITALS
SYSTOLIC BLOOD PRESSURE: 127 MMHG | WEIGHT: 175 LBS | HEIGHT: 67 IN | DIASTOLIC BLOOD PRESSURE: 89 MMHG | BODY MASS INDEX: 27.47 KG/M2 | HEART RATE: 105 BPM

## 2024-02-16 NOTE — PROGRESS NOTES
Subjective: Ernestine is 2 weeks postpartum after a vaginal delivery   See L&D delivery summary for birth statistics.  She is without concerns today. Bleeding is decreasing and not experiencing any excessive pain.    Breastfeeding successfully and reports infant is experiencing adequate weight gain.    She denies any signs/symptoms of postpartum depression and has adequate family support.  Denies any abuse.    Contraceptive plan: JOHNSON    Constitutional: negative; feels well  Eyes: negative; denies blurred or double vision  ENT: negative; denies nasal congestion or sinus pain  Cardiovascular: negative; denies chest pain or palpitations  Respiratory: negative; denies shortness of breath  Gastrointestinal: negative; denies heartburn, abdominal pain, diarrhea or constiptation  Genitourinary: negative; denies dysuria, incontinence, vaginal itching or discharge.    Musculoskeletal: negative; denies back pain.  Skin/Breast: negative; Denies any breast pain, lumps, or discharge.  Denies any skin lesions.  Neurological: negative; denies headaches, extremity weakness or numbness.  Psychiatric: negative; denies depression or anxiety.        Objective:   Vitals:    02/14/24 1202   BP: 127/89   Pulse: 105     Wt Readings from Last 6 Encounters:   02/14/24 175 lb (79.4 kg)   01/25/24 196 lb (88.9 kg)   01/24/24 196 lb (88.9 kg)   01/18/24 193 lb (87.5 kg)   01/09/24 190 lb (86.2 kg)   01/02/24 188 lb (85.3 kg)       Physical Exam  Constitutional:       General: She is not in acute distress.     Appearance: Normal appearance. She is not ill-appearing, toxic-appearing or diaphoretic.   Cardiovascular:      Rate and Rhythm: Normal rate.   Pulmonary:      Effort: Pulmonary effort is normal.   Neurological:      Mental Status: She is alert and oriented to person, place, and time.   Psychiatric:         Mood and Affect: Mood normal.         Behavior: Behavior normal.         Thought Content: Thought content normal.         Judgment:  Judgment normal.   Vitals reviewed.      Pelvic deferred    Assessment/Plan:   2 weeks postpartum, stable. Breast / Formula feeding without difficulty  Contraception: JOHNSON fertility awareness  Return to clinic: 4 weeks    Counseling included:   Signs/symptoms of postpartum depression  Postpartum danger signs  Lactation support and troubleshooting  Maternal and family adaption  Sleep/rest strategies  Sexuality  Infant safety and care  Parenting concerns  Occupational concerns  Contraception    Andela verbalized understanding of plan of care. All questions answered. No barriers to learning identified.   Diagnoses and all orders for this visit:    Postpartum normal course

## 2024-03-05 ENCOUNTER — TELEPHONE (OUTPATIENT)
Dept: OBGYN UNIT | Facility: HOSPITAL | Age: 29
End: 2024-03-05

## 2024-03-06 ENCOUNTER — POSTPARTUM (OUTPATIENT)
Dept: OBGYN CLINIC | Facility: CLINIC | Age: 29
End: 2024-03-06

## 2024-03-06 VITALS
WEIGHT: 171 LBS | DIASTOLIC BLOOD PRESSURE: 60 MMHG | BODY MASS INDEX: 26.84 KG/M2 | HEART RATE: 106 BPM | SYSTOLIC BLOOD PRESSURE: 119 MMHG | HEIGHT: 67 IN

## 2024-03-06 PROBLEM — O13.3 GESTATIONAL HYPERTENSION, THIRD TRIMESTER (HCC): Status: RESOLVED | Noted: 2024-01-24 | Resolved: 2024-03-06

## 2024-03-06 PROBLEM — Z28.39 SUSCEPTIBLE TO VARICELLA (NON-IMMUNE), CURRENTLY PREGNANT (HCC): Status: RESOLVED | Noted: 2024-01-07 | Resolved: 2024-03-06

## 2024-03-06 PROBLEM — Z28.39 RUBELLA NON-IMMUNE STATUS, ANTEPARTUM (HCC): Status: RESOLVED | Noted: 2024-01-03 | Resolved: 2024-03-06

## 2024-03-06 PROBLEM — Z34.90 ENCOUNTER FOR INDUCTION OF LABOR (HCC): Status: RESOLVED | Noted: 2024-01-25 | Resolved: 2024-03-06

## 2024-03-06 PROBLEM — O09.899 SUSCEPTIBLE TO VARICELLA (NON-IMMUNE), CURRENTLY PREGNANT (HCC): Status: RESOLVED | Noted: 2024-01-07 | Resolved: 2024-03-06

## 2024-03-06 PROBLEM — Z71.85 VACCINE COUNSELING: Status: RESOLVED | Noted: 2024-01-02 | Resolved: 2024-03-06

## 2024-03-06 PROBLEM — Z34.90 PREGNANCY (HCC): Status: RESOLVED | Noted: 2023-11-29 | Resolved: 2024-03-06

## 2024-03-06 PROBLEM — O99.820 GROUP B STREPTOCOCCAL CARRIAGE COMPLICATING PREGNANCY (HCC): Status: RESOLVED | Noted: 2024-01-05 | Resolved: 2024-03-06

## 2024-03-06 PROBLEM — Z28.21 TETANUS, DIPHTHERIA, AND ACELLULAR PERTUSSIS (TDAP) VACCINATION DECLINED: Status: RESOLVED | Noted: 2023-12-14 | Resolved: 2024-03-06

## 2024-03-06 PROBLEM — O09.899 RUBELLA NON-IMMUNE STATUS, ANTEPARTUM (HCC): Status: RESOLVED | Noted: 2024-01-03 | Resolved: 2024-03-06

## 2024-03-06 NOTE — PROGRESS NOTES
Chief Complaint   Patient presents with    Postpartum Care     MALE 7LB 13OZ, vaginal delivery, NOT INTERESTED IN BC ,  GAD7 score 0         HPI:   Ernestine is 28 year old , here today for 6-week postpartum visit. She reports feeling great   Type and date of Delivery:  on 24, Complications: 1st degree labial laceration  See Delivery Summary for baby's gender and weight  Perineum: well healed   Feeding Method: breast  Bonding: bonding well with baby   Maternal sleep: 6-8 hours/night. Nappin-2 times a day   Sexual activity resumed: no, feels ready to resume. Contraceptive Method: declines  Postpartum Depression screen: negative         HISTORY:  Patient Active Problem List   Diagnosis    Pregnancy ([facility])    Tetanus, diphtheria, and acellular pertussis (Tdap) vaccination declined    Vaccine counseling    Rubella non-immune status, antepartum ([facility])    Group B streptococcal carriage complicating pregnancy ([facility])    Susceptible to varicella (non-immune), currently pregnant ([facility])    Gestational hypertension, third trimester ([facility])    Encounter for induction of labor ([facility])     (normal spontaneous vaginal delivery) ([facility])       Wt Readings from Last 6 Encounters:   24 171 lb (77.6 kg)   24 175 lb (79.4 kg)   24 196 lb (88.9 kg)   24 196 lb (88.9 kg)   24 193 lb (87.5 kg)   24 190 lb (86.2 kg)       42 lb (19.1 kg)    Medications (Active prior to today's visit):  Current Outpatient Medications   Medication Sig Dispense Refill    prenatal vitamin with DHA 27-0.8-228 MG Oral Cap Take 1 capsule by mouth daily.      labetalol 200 MG Oral Tab Take 1 tablet (200 mg total) by mouth every 12 (twelve) hours. (Patient not taking: Reported on 3/6/2024) 60 tablet 0       Allergies:   No Known Allergies    PHYSICAL EXAM:   Vitals:    24 1344   BP: 119/60   Pulse: 106     Physical Exam  Vitals reviewed.   Constitutional:       Appearance: Normal appearance.   HENT:      Head:  Normocephalic.   Cardiovascular:      Rate and Rhythm: Normal rate.   Pulmonary:      Effort: Pulmonary effort is normal.   Genitourinary:     General: Normal vulva.      Labia:         Right: No rash, tenderness, lesion or injury.         Left: No rash, tenderness, lesion or injury.       Vagina: Normal. No vaginal discharge, tenderness or bleeding.   Skin:     General: Skin is warm and dry.   Neurological:      Mental Status: She is alert and oriented to person, place, and time.          No results found for this or any previous visit (from the past 24 hour(s)).       ASSESSMENT/PLAN:   Ernestine was seen today for postpartum care.    Diagnoses and all orders for this visit:    6 weeks postpartum follow-up (HCC)             Next annual due: due, pt to make serafin  Follow-up/Return to clinic: as needed     Counseling:  Postpartum teaching including maternal and family adaptation, sleep/rest strategies, lactation, sexuality/contraception/ use of lubrication during intercourse, importance of Kegel's and abdominal exercises, continuation of prenatal vitamins, and signs/symptoms of postpartum depression  Patient verbalized understanding, All questions answered. No barriers to learning identified     MURIEL Ahuja under direct supervision of Ronit Seay CNM    I personally performed the patient's exam and medical decision making on this date of service.  I was physically present in the room for the performance of the E/M service.  I have reviewed the LOLLY student's documentation and findings including history, Exam, and Medical Decision Making, edited the document for accuracy and verify that it represents the clinical findings and services performed.      [de-identified] : The underlying pathophysiology was reviewed with the patient. Treatment options were discussed including; observation, NSAIDS, analgesics, lino taping.\par \par Patient was advised to lino tape the ring finger to the adjacent finger to aid in movement recovery. \par I also advised that he refrain from football or other high contact sports/activities for the interim. \par Gentle range of motion exercises involving the left hand were encouraged, as tolerated by pain. \par NSAIDs as tolerated. \par The patient was advised to soak the hand in warm water and Epsom salt.\par A note for school was provided. \par Follow up in 4 weeks.

## 2024-03-06 NOTE — PROGRESS NOTES
I personally performed the patient's exam and medical decision making on this date of service.  I was physically present in the room for the performance of the E/M service.  I have reviewed the CNM student's documentation and findings including history, Exam, and Medical Decision Making, edited the document for accuracy and verify that it represents the clinical findings and services performed.     Diagnoses and all orders for this visit:    6 weeks postpartum follow-up (HCC)

## 2024-09-23 ENCOUNTER — OFFICE VISIT (OUTPATIENT)
Dept: OBGYN CLINIC | Facility: CLINIC | Age: 29
End: 2024-09-23

## 2024-09-23 VITALS
SYSTOLIC BLOOD PRESSURE: 117 MMHG | HEIGHT: 67 IN | BODY MASS INDEX: 25.46 KG/M2 | HEART RATE: 93 BPM | DIASTOLIC BLOOD PRESSURE: 77 MMHG | WEIGHT: 162.19 LBS

## 2024-09-23 DIAGNOSIS — Z01.419 WELL WOMAN EXAM: ICD-10-CM

## 2024-09-23 DIAGNOSIS — Z01.419 ENCOUNTER FOR ROUTINE GYNECOLOGICAL EXAMINATION WITH PAPANICOLAOU SMEAR OF CERVIX: Primary | ICD-10-CM

## 2024-09-23 PROCEDURE — 99395 PREV VISIT EST AGE 18-39: CPT | Performed by: ADVANCED PRACTICE MIDWIFE

## 2024-09-23 NOTE — PROGRESS NOTES
HPI:   Ernestine Huff is a 29 year old female who presents for a gyne annual physical exam.   Chief Complaint   Patient presents with    Gyn Exam     Pt is here for pap smear no concerns for provider.      Still breastfeeding. Period resumed @ 2 months ago.     Wt Readings from Last 3 Encounters:   24 162 lb 3.2 oz (73.6 kg)   24 171 lb (77.6 kg)   24 175 lb (79.4 kg)     Body mass index is 25.4 kg/m².     Menstrual History:  OB History    Para Term  AB Living   1 1 1 0 0 1   SAB IAB Ectopic Multiple Live Births   0 0 0 0 1        Patient's last menstrual period was 2024 (approximate).       Current Outpatient Medications   Medication Sig Dispense Refill    prenatal vitamin with DHA 27-0.8-228 MG Oral Cap Take 1 capsule by mouth daily.      labetalol 200 MG Oral Tab Take 1 tablet (200 mg total) by mouth every 12 (twelve) hours. (Patient not taking: Reported on 3/6/2024) 60 tablet 0      Past Medical History:    Encounter for induction of labor (HCC)    Gestational hypertension, third trimester (HCC)    Group B streptococcal carriage complicating pregnancy (HCC)     (normal spontaneous vaginal delivery) (HCC)    Pregnancy (HCC)    3T transfer of care from [facility]. Pt to upload labs and ultrasounds to [facility]  23- missing labs ordered     Plans epidural, no circ, ok with routine meds  Birth plan reviewed:    Support: , Arun  Pain management: OK with epidural  Vitamin K: yes  Erythromycin ointment: yes  Placenta: cord blood banking,   Circumcision: boy, declines  Peds: in House, undecided on practice  Housing/car    Rubella non-immune status, antepartum (HCC)    MMR PP    Susceptible to varicella (non-immune), currently pregnant ([facility])    Tetanus, diphtheria, and acellular pertussis (Tdap) vaccination declined    Vaccine counseling    Declines RSV      Past Surgical History:   Procedure Laterality Date    Abltj cryosurg w/us gdn ea fibroadenoma Bilateral        Family History   Problem Relation Age of Onset    Hypertension Father       Social History:   Social History     Socioeconomic History    Marital status: Unknown   Tobacco Use    Smoking status: Never    Smokeless tobacco: Never   Substance and Sexual Activity    Alcohol use: Never    Drug use: Never     Social Determinants of Health     Financial Resource Strain: Low Risk  (1/25/2024)    Financial Resource Strain     Difficulty of Paying Living Expenses: Not very hard     Med Affordability: No   Food Insecurity: No Food Insecurity (1/25/2024)    Food Insecurity     Food Insecurity: Never true   Transportation Needs: No Transportation Needs (1/25/2024)    Transportation Needs     Lack of Transportation: No   Stress: No Stress Concern Present (1/25/2024)    Stress     Feeling of Stress : No   Housing Stability: Low Risk  (1/25/2024)    Housing Stability     Housing Instability: No        REVIEW OF SYSTEMS:   GENERAL: feels well otherwise  SKIN: denies any unusual skin lesions  BREAST: denies masses, abnormal discharge or pain; reports self breast exam   GI: denies abdominal pain, nausea or vomiting  : denies urinary complaints, vaginal discharge or itching, dyspareunia, reports periods regular   MUSCULOSKELETAL: denies back pain  PSYCHE: denies depression or anxiety, denies exposure to violence or abuse.    EXAM:   /77 (BP Location: Left arm, Patient Position: Sitting, Cuff Size: adult)   Pulse 93   Ht 5' 7\" (1.702 m)   Wt 162 lb 3.2 oz (73.6 kg)   LMP 09/06/2024 (Approximate)   Breastfeeding Yes   BMI 25.40 kg/m²   GENERAL: well developed, well nourished,in no apparent distress  SKIN: no rashes,no suspicious lesions  HEENT: atraumatic, normocephalic  LUNGS: normal effort  GI: no masses, hernia or tenderness  :labia intact bilaterally, no lesions, perineum and introitus is normal, pink vaginal walls +rugae, scant physiologic discharge, cervix is pink with no lesions, neg CMT, no adnexal masses or  tenderness, uterus is nontender, mobile, no masses palpable;  PAP today   RECTAL: no hemorrhoids, no lesions  MUSCULOSKELETAL: back is not tender, no gross MSK abnormality  EXTREMITIES: no varicosities or edema  NEURO: Oriented times three, motor and sensory are grossly intact    ASSESSMENT AND PLAN:   Ernestine Huff is a 29 year old female who presents for a gyne annual physical exam.     1. Encounter for routine gynecological examination with Papanicolaou smear of cervix  Normal exam. Breastfeeding. Not contracepting - planning future pregnancy.     2. Well woman exam    - ThinPrep PAP Smear; Future  - Hpv Dna  High Risk , Thin Prep Collect; Future      Catie Contreras CNM  9/23/2024  11:14 AM

## 2024-09-25 ENCOUNTER — TELEPHONE (OUTPATIENT)
Dept: OBGYN CLINIC | Facility: CLINIC | Age: 29
End: 2024-09-25

## 2024-09-25 LAB — HPV E6+E7 MRNA CVX QL NAA+PROBE: POSITIVE

## 2024-09-25 NOTE — TELEPHONE ENCOUNTER
Name and  verified. Pt informed that results of HPV genotyping still in process. Answered questions regarding HPV and pap results. Pt aware that once all results have finalized, provider will review and offer recommendations.

## 2024-09-26 LAB
HPV16 DNA CVX QL PROBE+SIG AMP: NEGATIVE
HPV18 DNA CVX QL PROBE+SIG AMP: NEGATIVE

## 2024-10-02 ENCOUNTER — TELEPHONE (OUTPATIENT)
Dept: OBGYN CLINIC | Facility: CLINIC | Age: 29
End: 2024-10-02

## 2024-10-02 PROBLEM — R87.612 LGSIL ON PAP SMEAR OF CERVIX: Status: ACTIVE | Noted: 2024-10-02

## 2024-10-02 PROBLEM — R87.810 CYTOLOGY EXAMINATION POSITIVE FOR HIGH RISK HUMAN PAPILLOMAVIRUS (HPV): Status: ACTIVE | Noted: 2024-10-02

## 2024-10-02 NOTE — TELEPHONE ENCOUNTER
Patient states Catie commented on er test results but will like a call back to discuss with her. Please advise

## 2024-10-03 ENCOUNTER — TELEPHONE (OUTPATIENT)
Dept: OBGYN CLINIC | Facility: CLINIC | Age: 29
End: 2024-10-03

## 2024-10-03 NOTE — TELEPHONE ENCOUNTER
Spoke with patient by phone and reviewed pap results and ASCCP guidelines to repeat pap in 1 year. However, her provider is recommending a colposcopy. Advised patient to speak with provider to determine best plan moving forward. Pt can call office on Monday to speak with Catie Contreras. She voiced understanding and agreed with plan. All questions answered.

## 2024-10-04 NOTE — TELEPHONE ENCOUNTER
Pt to call Catie this Monday 10/08/24 to discuss her pap and Hpv results, and what next steps will be.

## 2024-10-07 ENCOUNTER — TELEPHONE (OUTPATIENT)
Dept: OBGYN CLINIC | Facility: CLINIC | Age: 29
End: 2024-10-07

## 2024-10-10 ENCOUNTER — TELEPHONE (OUTPATIENT)
Dept: OBGYN CLINIC | Facility: CLINIC | Age: 29
End: 2024-10-10

## 2024-12-27 ENCOUNTER — OFFICE VISIT (OUTPATIENT)
Dept: OBGYN CLINIC | Facility: CLINIC | Age: 29
End: 2024-12-27
Payer: MEDICAID

## 2024-12-27 VITALS
HEART RATE: 120 BPM | HEIGHT: 67 IN | SYSTOLIC BLOOD PRESSURE: 125 MMHG | DIASTOLIC BLOOD PRESSURE: 90 MMHG | WEIGHT: 155.81 LBS | BODY MASS INDEX: 24.45 KG/M2

## 2024-12-27 DIAGNOSIS — N63.22 MASS OF UPPER INNER QUADRANT OF LEFT BREAST: Primary | ICD-10-CM

## 2024-12-27 DIAGNOSIS — Z32.02 PREGNANCY EXAMINATION OR TEST, NEGATIVE RESULT: ICD-10-CM

## 2024-12-27 LAB
CONTROL LINE PRESENT WITH A CLEAR BACKGROUND (YES/NO): YES YES/NO
KIT LOT #: NORMAL NUMERIC
PREGNANCY TEST, URINE: NEGATIVE

## 2024-12-27 PROCEDURE — 81025 URINE PREGNANCY TEST: CPT | Performed by: ADVANCED PRACTICE MIDWIFE

## 2024-12-27 PROCEDURE — 99213 OFFICE O/P EST LOW 20 MIN: CPT | Performed by: ADVANCED PRACTICE MIDWIFE

## 2024-12-27 NOTE — PROGRESS NOTES
Chief Complaint   Patient presents with    Gyn Exam     Pt is here for breast exam         HPI:   Ernestine is 29 year old , here today with concern for a non-tender lump that she palpated in her left breast. States she had a fibroadenoma removed from that location about 10 years ago so not sure if it is related but the lump is new. She remains breastfeeding her toddler.    Pt offered for MA to be present during exam and patient declined.    Patient Active Problem List   Diagnosis    LGSIL on Pap smear of cervix    Cytology examination positive for high risk human papillomavirus (HPV)        Note: This is a gyn only visit. Pt has PCP and is referred back to PCP for care of any non-gyn concerns listed above in the Problem List.    Medications (Active prior to today's visit):  Current Outpatient Medications   Medication Sig Dispense Refill    prenatal vitamin with DHA 27-0.8-228 MG Oral Cap Take 1 capsule by mouth daily.      labetalol 200 MG Oral Tab Take 1 tablet (200 mg total) by mouth every 12 (twelve) hours. (Patient not taking: Reported on 2024) 60 tablet 0       Allergies:  Allergies[1]    ROS:  Review of Systems   Constitutional: Negative.    Genitourinary: Negative.        PHYSICAL EXAM:  Vitals:    24 1336   BP: 125/90   Pulse: 120     Physical Exam  Vitals reviewed.   Constitutional:       Appearance: Normal appearance.   HENT:      Head: Normocephalic.   Pulmonary:      Effort: Pulmonary effort is normal.   Chest:   Breasts:     Breasts are asymmetrical.      Right: No inverted nipple, mass or tenderness.      Left: Mass present. No inverted nipple or tenderness.      Comments: Right breast is larger than left - pt reports this is normal and has always been that way  Neurological:      Mental Status: She is alert and oriented to person, place, and time.   Psychiatric:         Behavior: Behavior normal.         Thought Content: Thought content normal.         Judgment: Judgment normal.      Negative urine pregnancy test today      ASSESSMENT/PLAN:     Ernestine was seen today for gyn exam.    Diagnoses and all orders for this visit:    Mass of upper inner quadrant of left breast  -     Mammo Diagnostic BILATERAL; Future       Follow-up/Return to clinic: PRN    I have spent 20 minutes total time on the day of the encounter, including: preparing to see the patient, ordering/reviewing labs, performing a medically appropriate exam, and providing care coordination. face to face counseling, chart review, orders and coordination of care    Patient verbalized understanding, All questions answered. No barriers to learning identified         [1] No Known Allergies

## 2025-01-03 ENCOUNTER — HOSPITAL ENCOUNTER (OUTPATIENT)
Dept: MAMMOGRAPHY | Facility: HOSPITAL | Age: 30
Discharge: HOME | End: 2025-01-03
Attending: ADVANCED PRACTICE MIDWIFE
Payer: MEDICAID

## 2025-01-03 DIAGNOSIS — N63.22 MASS OF UPPER INNER QUADRANT OF LEFT BREAST: ICD-10-CM

## 2025-01-03 PROCEDURE — 76642 ULTRASOUND BREAST LIMITED: CPT | Performed by: ADVANCED PRACTICE MIDWIFE

## 2025-01-03 NOTE — IMAGING NOTE
This nurse introduced self and role of breast coordinator.  Discussed recommended breast biopsy with patient. Pt was recommended by Dr. Huston to have a left  breast ultrasound guided biopsy. Pt refused mammogram today.  Spouses name is Arun. Super orders placed pt was provided Monday 1/13 checking in at 615 am. [facility].   Pt  instructed to pump before Bx and to pump and discard for 24 hours after Bx.  Pt verbalizes understanding and agreement   Hx palpable left breast  x 1 week pt is breast feeding.  Pt has son 11 months . Pt informed of risk of milk fistula by Dr Huston .    Pt history discussed as below:  Pt history of biopsy:  no      Family history of cancer:no   Pt history of breast cancer: no   Hx BCP use:         no            HRT use:    no ivf no       Recommedations :                      see [facility] for dictated radiology report   Reviewed pertinent patient history, family history of cancer, and patient medications.     -All herbal supplements, Vitamin E, Fish Oil    -All NSAIDs (Ibuprofen, Motrin, Advil, Aleve, or other antiinflammatory medication)  and Aspirin  81mg currently being taken    not  recommended or prescribed by  your physician  should be held for 5  days prior to biopsy.  Denies usage   -Aspirin 81 mg being taken related to a cardiac condition  or prescribed by your  physician should be held at the  direction of your physician Informed patient to call ordering physician for guidelines denies usage    -Blood thinners/antiplatelet medications (Coumadin, Plavix ect) should be held at the  direction of your physician.  Informed patient to call ordering physician for guidelines  denies usage   Reviewed US guided biopsy procedure, as below.  You will be lying on your back, potentially slightly toward one side, for this procedure.  The US technician will use the ultrasound machine to locate the area in question that was seen on your previous breast imaging.  The Radiologist will then inject a  local numbing medication into the area. This may burn and sting for several seconds .  Then use a needle to collect cells or tissue from the site.  A marker, or clip, will then be placed in the biopsied area.  This marker is placed so this biopsy site is able to be accurately located upon future breast imaging.  After the clip is placed, steri strips will be applied to  the biopsy site and should be kept on for 5 days.  Additional mammography films will then be taken to assure correct placement of the placed marker.    Educated the patient they will be awake during this procedure and are able to drive themselves home if they wish.  Educated patient that they should eat breakfast and park in green lot and check in with [facility] .  Educated patient that some soreness  may occur after biopsy.  Discussed use of a supportive bra and ice packs after procedure, to decrease soreness.  Tylenol only for discomfort unless they have an allergy to tylenol .  Discussed with patient no swimming, bathing,  hot tubs or submerging underwater  for 5 days post procedure until the incision is closed and healed.   Educated patient on lifting restrictions - nothing heavier than a gallon of milk for 24-48 hours after the procedure.      Discussed with patient that some soreness and bruising is normal after biopsy but that prolonged or increased pain and bruising should be reported to the ordering physician.   Educated patient that they should bring a sports bra or form fitting bra on day of procedure. Educated patient that this helps with comfort after the biopsy and decrease swelling.  Reviewed results process with patient and shared that pathology results will be available within 2-3 business days of their biopsy.  Discussed results will be communicated by their ordering physician unless otherwise indicated.  Educated patient that once we receive an order from her physician  our radiology secretaries would be calling   to schedule  the biopsy.

## 2025-01-06 DIAGNOSIS — N63.22 MASS OF UPPER INNER QUADRANT OF LEFT BREAST: Primary | ICD-10-CM

## 2025-01-06 NOTE — PROGRESS NOTES
Pt notified by phone of breast ultrasound results and recommendation to biopsy the lump identified at 2:00 and to closely follow-up the one at 3:00 with 6-month repeat imaging. She is currently breastfeeding and would like to know if there are other options she can consider prior to biopsy. Referral placed to breast specialist for consultation. Pt to schedule. She voiced understanding and agreed with plan. All questions answered.

## 2025-01-07 NOTE — DISCHARGE INSTRUCTIONS
The Doctor (Radiologist) who performed your procedure was:     Place an ice pack over the biopsy site on top of your bra or on top of the ACE wrap (never apply ice directly over skin) for 10-15 minutes of every hour until bedtime for your comfort and to decrease bleeding.  Keep your sports bra or the ACE wrap (stereotactic and MRI biopsy) in place for 24 hours after your biopsy. This compression decreases bleeding and breast movement for your comfort. Wear a supportive bra for the next couple of days for comfort (sports bra for sleep).   Continue to wear, preferably, a sports bra or good supportive bra for 1 week and take off only to shower.  No baths or showers during the first 24 hours after biopsy. After this time you may take a shower. It's okay if the strips get wet but do not soak them. NO saunas, hot tubs or swimming until steri-strips fall off (approx. 5 days). This prevents infection and allows time for them to completely close and heal.  DO NOT remove the steri-strips. They will fall off in 5 days. If any type of irritation (redness, itching or blisters) develops in the area around the steri-strips, remove them gently. If the steri-strips do not fall off after 5 days, gently remove them. Keep the area clean and dry.  It is normal to have mild discomfort and bruising at the biopsy site.  You may take Tylenol as needed for discomfort, as long as you have no allergies to Tylenol. Do not take aspirin, motrin, ibuprofen or any medication containing NSAID (non-steroidal anti-inflammatory drug) product for 48 hours.  DO NOT participate in strenuous activity (aerobics, heavy lifting, housework, gardening, etc.) 48 hours after your biopsy to prevent bleeding.  You will receive results in 2-3 business days.  If you are having an MRI breast biopsy or an Ultrasound guided breast biopsy, you will be billed for the biopsy and unilateral mammogram separately.  If you have any questions about the procedure or your  results, please contact the Breast Care Coordinator Nurse at [phone].  Notify your ordering physician or primary physician for increased bleeding, pain or fever over 100. Or contact a Radiology Nurse at [phone] between 8am-4pm (after 4pm, your call will be directed to the [facility]).

## 2025-01-13 ENCOUNTER — HOSPITAL ENCOUNTER (OUTPATIENT)
Dept: ULTRASOUND IMAGING | Facility: HOSPITAL | Age: 30
Discharge: HOME | End: 2025-01-13
Attending: ADVANCED PRACTICE MIDWIFE
Payer: MEDICAID

## (undated) NOTE — LETTER
Dear New MomWilly, we missed you! The nurses of [facility] [place] have tried to reach you by phone to ask if you have any questions regarding your health or the health and care of your new little one.    We hope you are doing well. If, for any reason, you have questions or concerns about your health or your baby’s health, please contact your provider or your pediatrician or family medicine physician regarding your baby.     At [facility], we feel that postpartum support is very important for new families. Please see the enclosed new parent support flyer that lists support programs and resources with both in-person and online options.     Additionally, our Breastfeeding Centers at [facility] and [facility] in [place], offer outpatient visits with our International Board-Certified Lactation   Consultants (IBCLCs) for any breastfeeding concerns or questions you may have.    For issues related to stress, anxiety or depression, we have a Nurturing Mom support group that meets both in-person or online.  There’s also a 24-hour Mom’s Line where you can request a phone call from a clinical therapist for assistance for postpartum depression.    We encourage you to take advantage of these programs and resources as you recover from childbirth and learn to care for your new infant.    Best wishes,    Count includes the [facility] Nurses            [phone]

## (undated) NOTE — LETTER
[facility] ULTRASOUND [facility] FOR [facility]  [address] [address]  Authorization for Imaging Procedure  Date of Procedure:     I hereby authorize                                , my physician and his/her assistants (if applicable), which may include medical students, residents, and/or fellows, to perform the following procedure and administer such anesthesia as may be determined necessary by my physician: ULTRASOUND GUIDED LEFT BREAST BIOPSY WITH CLIP PLACEMENT on Ernestine Huff.   2.  I recognize that during the procedure, unforeseen conditions may necessitate additional or different procedures than those listed above. I, therefore, further authorize and request that the above-named physician, assistants, or designees perform such procedures as are, in their judgment, necessary and desirable.    3.  My physician has discussed prior to my procedure the potential benefits, risks and side effects of this procedure; the likelihood of achieving goals; and potential problems that might occur during recuperation. They also discussed reasonable alternatives to the procedure, including risks, benefits, and side effects related to the alternatives and risks related to not receiving this procedure. I have had all my questions answered and I acknowledge that no guarantee has been made as to the result that may be obtained.  I AM AWARE THERE IS A POSSIBLE RISK FOR MILK FISTULA _________  4.  Should the need arise during my procedure, which includes change of level of care prior to discharge, I also consent to the administration of blood and/or blood products. Further, I understand that despite careful testing and screening of blood or blood products by collecting agencies, I may still be subject to ill effects as a result of receiving a blood transfusion and/or blood products. The following are some, but not all, of the potential risks that can occur: fever and allergic reactions, hemolytic  reactions, transmission of diseases such as Hepatitis, AIDS and Cytomegalovirus (CMV) and fluid overload. In the event that I wish to have an autologous transfusion of my own blood, or a directed donor transfusion, I will discuss this with my physician.  Check only if Refusing Blood or Blood Products  I understand refusal of blood or blood products as deemed necessary by my physician may have serious consequences to my condition to include possible death. I hereby assume responsibility for my refusal and release the hospital, its personnel, and my physicians from any responsibility for the consequences of my refusal.   [  ] Patient Refuses Blood      5.  I authorize the use of any specimen, organs, tissues, body parts or foreign objects that may be removed from my body during the procedure for diagnosis, research or teaching purposes and their subsequent disposal by hospital authorities. I also authorize the release of specimen test results and/or written reports to my treating physician on the hospital medical staff or other referring or consulting physicians involved in my care, at the discretion of the Pathologist or my treating physician.    6.  I consent to the photographing or videotaping of the procedures to be performed, including appropriate portions of my body for medical, scientific, or educational purposes, provided my identity is not revealed by the pictures or by descriptive texts accompanying them. If the procedure has been photographed/videotaped, the physician will obtain the original picture, image, videotape or CD. The hospital will not be responsible for storage, release or maintenance of the picture, image, tape or CD.   7.  I consent to the presence of a  or observers in the operating room as deemed necessary by my physician or their designees.    8.  I recognize that in the event my procedure results in extended X-Ray/fluoroscopy time, I may develop a skin reaction.    9.  If  I have a Do Not Attempt Resuscitation (DNAR) order in place, that status will be suspended while in the operating room, procedural suite, and during the recovery period unless otherwise explicitly stated by me (or a person authorized to consent on my behalf). The performing physician or my attending physician will determine when the applicable recovery period ends for purposes of reinstating the DNAR order.  10.  I acknowledge that my physician has explained sedation/analgesia administration to me including the risk and benefits I consent to the administration of sedation/analgesia as may be necessary or desirable in the judgment of my physician.      I CERTIFY THAT I HAVE READ AND FULLY UNDERSTAND THE ABOVE CONSENT FOR THE PROCEDURE.     Signature of Patient: _____________________________________________________________  Responsible person in case of minor, unconscious: ____________________________________  Relationship to patient:  __________________________________________________________  Signature of Witness: _______________________________Date: _________Time: __________    Statement of Physician: My signature below affirms that prior to the time of the procedure, I have explained to the patient and/or her guardian, the risks and benefits involved in the proposed treatment and any reasonable alternative to the proposed treatment. I have also explained the risks and benefits involved in the refusal of the proposed treatment and have answered the patient's questions. If I have a significant financial interest in a co-management agreement or a significant financial interest in any product or implant, or other significant relationship used in the procedure/surgery, I have disclosed this and had a discussion with my patient.  Signature of Physician:   _________________________________Date:_____________Time:________    Patient Name: Holdenadrian Refugio : 1995  Printed: 2025   Medical Record #:  [id number]